# Patient Record
Sex: FEMALE | Race: WHITE | NOT HISPANIC OR LATINO | Employment: STUDENT | URBAN - METROPOLITAN AREA
[De-identification: names, ages, dates, MRNs, and addresses within clinical notes are randomized per-mention and may not be internally consistent; named-entity substitution may affect disease eponyms.]

---

## 2017-04-10 ENCOUNTER — TELEPHONE (OUTPATIENT)
Dept: OBSTETRICS AND GYNECOLOGY | Facility: CLINIC | Age: 22
End: 2017-04-10

## 2017-04-10 DIAGNOSIS — N92.1 BREAKTHROUGH BLEEDING WITH IUD: Primary | ICD-10-CM

## 2017-04-10 DIAGNOSIS — Z97.5 BREAKTHROUGH BLEEDING WITH IUD: Primary | ICD-10-CM

## 2017-04-10 RX ORDER — ESTRADIOL 1 MG/1
1 TABLET ORAL DAILY
Qty: 7 TABLET | Refills: 0 | Status: SHIPPED | OUTPATIENT
Start: 2017-04-10 | End: 2017-04-17

## 2017-04-10 NOTE — TELEPHONE ENCOUNTER
I spoke with the patient.  She reports that she previously had been having regular periods that were very light, following the Leesa insertion.  Her current bleeding episode began about 1 month ago.  It started as a regular period but was heavy.  The bleeding never stopped.  He has been ongoing for about 1 month.  She does report that now her bleeding is very scant and appears to be stopping.  I explained to the patient the likely mechanisms of breakthrough bleeding associated with progesterone IUDs.  This may be due to prolonged progestin effect.  I advised the patient to monitor her bleeding for another 2-3 days.  If it stops, I would just recommend routine follow-up.  If the bleeding intensifies again, we may try a short course of oral estradiol once daily for 7 days.  If her bleeding is persistent despite the oral estradiol, she will need to come to the office to see me for evaluation.  She verbalized understanding.    ----- Message from Cody Jarrell sent at 4/7/2017 12:46 PM EDT -----  Patient has been on leesa for 2 years and for the last month she has had constant bleeding. She says the bleeding is not heavy. She doesn't have health insurance at the moment so she doesn't want to come in for a visit. She wants to know what her options are.

## 2017-06-15 ENCOUNTER — TELEPHONE (OUTPATIENT)
Dept: OBSTETRICS AND GYNECOLOGY | Facility: CLINIC | Age: 22
End: 2017-06-15

## 2017-06-15 NOTE — TELEPHONE ENCOUNTER
Chanell, please let her know Dr. Levy is out of the office until next week and I reviewed her chart and he recommended that if her bleeding continued to be an issue, she should come see him.  If her bleeding is not heavy, then it does not need to be ASAP.  She can see him next available appointment.    ----- Message from Chanell Reyes sent at 6/15/2017  3:37 PM EDT -----  PT called to report 3 week period after not having period for a month and then being prescribed estrogen pills. No heavy bleeding; however, heavy cramping and nausea. Would like to know if she needs to be seen ASAP.

## 2017-07-13 ENCOUNTER — TELEPHONE (OUTPATIENT)
Dept: OBSTETRICS AND GYNECOLOGY | Facility: CLINIC | Age: 22
End: 2017-07-13

## 2017-08-21 ENCOUNTER — OFFICE VISIT (OUTPATIENT)
Dept: OBSTETRICS AND GYNECOLOGY | Facility: CLINIC | Age: 22
End: 2017-08-21

## 2017-08-21 ENCOUNTER — TELEPHONE (OUTPATIENT)
Dept: OBSTETRICS AND GYNECOLOGY | Facility: CLINIC | Age: 22
End: 2017-08-21

## 2017-08-21 VITALS
BODY MASS INDEX: 24.84 KG/M2 | WEIGHT: 135 LBS | DIASTOLIC BLOOD PRESSURE: 81 MMHG | HEIGHT: 62 IN | SYSTOLIC BLOOD PRESSURE: 126 MMHG | HEART RATE: 99 BPM

## 2017-08-21 DIAGNOSIS — N89.8 VAGINAL DISCHARGE: Primary | ICD-10-CM

## 2017-08-21 DIAGNOSIS — Z11.4 SCREENING FOR HIV (HUMAN IMMUNODEFICIENCY VIRUS): ICD-10-CM

## 2017-08-21 DIAGNOSIS — Z20.2 EXPOSURE TO STD: ICD-10-CM

## 2017-08-21 DIAGNOSIS — Z11.3 SCREEN FOR STD (SEXUALLY TRANSMITTED DISEASE): ICD-10-CM

## 2017-08-21 PROBLEM — Z97.5 BREAKTHROUGH BLEEDING WITH IUD: Status: RESOLVED | Noted: 2017-04-10 | Resolved: 2017-08-21

## 2017-08-21 PROBLEM — N92.1 BREAKTHROUGH BLEEDING WITH IUD: Status: RESOLVED | Noted: 2017-04-10 | Resolved: 2017-08-21

## 2017-08-21 LAB
BILIRUB BLD-MCNC: NEGATIVE MG/DL
CLARITY, POC: CLEAR
COLOR UR: YELLOW
GLUCOSE UR STRIP-MCNC: NEGATIVE MG/DL
KETONES UR QL: NEGATIVE
LEUKOCYTE EST, POC: ABNORMAL
NITRITE UR-MCNC: NEGATIVE MG/ML
PH UR: 5.5 [PH] (ref 5–8)
PROT UR STRIP-MCNC: NEGATIVE MG/DL
RBC # UR STRIP: NEGATIVE /UL
SP GR UR: 1.02 (ref 1–1.03)
UROBILINOGEN UR QL: NORMAL

## 2017-08-21 PROCEDURE — 96372 THER/PROPH/DIAG INJ SC/IM: CPT | Performed by: OBSTETRICS & GYNECOLOGY

## 2017-08-21 PROCEDURE — 99213 OFFICE O/P EST LOW 20 MIN: CPT | Performed by: OBSTETRICS & GYNECOLOGY

## 2017-08-21 RX ORDER — BUSPIRONE HYDROCHLORIDE 7.5 MG/1
TABLET ORAL
Refills: 3 | COMMUNITY
Start: 2017-08-08 | End: 2017-12-05

## 2017-08-21 RX ORDER — CEFTRIAXONE SODIUM 250 MG/1
250 INJECTION, POWDER, FOR SOLUTION INTRAMUSCULAR; INTRAVENOUS EVERY 24 HOURS
Status: DISCONTINUED | OUTPATIENT
Start: 2017-08-21 | End: 2017-12-05

## 2017-08-21 RX ORDER — AZITHROMYCIN 500 MG/1
TABLET, FILM COATED ORAL
Qty: 2 TABLET | Refills: 0 | Status: SHIPPED | OUTPATIENT
Start: 2017-08-21 | End: 2017-12-05

## 2017-08-21 RX ORDER — PAROXETINE HYDROCHLORIDE 20 MG/1
TABLET, FILM COATED ORAL
Refills: 3 | COMMUNITY
Start: 2017-08-08 | End: 2018-10-19 | Stop reason: ALTCHOICE

## 2017-08-21 RX ORDER — CEFTRIAXONE SODIUM 250 MG/1
250 INJECTION, POWDER, FOR SOLUTION INTRAMUSCULAR; INTRAVENOUS EVERY 24 HOURS
Status: DISCONTINUED | OUTPATIENT
Start: 2017-08-21 | End: 2017-08-21

## 2017-08-21 RX ADMIN — CEFTRIAXONE SODIUM 250 MG: 250 INJECTION, POWDER, FOR SOLUTION INTRAMUSCULAR; INTRAVENOUS at 16:17

## 2017-08-21 NOTE — PROGRESS NOTES
"Subjective   Latosha Cutler is a 21 y.o. female   CC: Pt here for STD check.  History of Present Illness  Pt here for STD check.  Patient reports that she was diagnosed with chlamydia infection about 2 months ago.  She reports that time she was having vaginal discharge, malodorous discharge, and irregular bleeding.  She reports that those symptoms have returned, and she is concerned that she may have been exposed to chlamydia again.  She has been sexually active with the same partner through which she contracted chlamydia last time.  She denies pelvic pain.  She denies abdominal pain.  She denies fevers, chills, nausea, vomiting.   She does report a history of frequent urinary tract infections.  She denies any dysuria today.  She does report some dark urine recently.  The patient requests to be treated empirically for gonorrhea and chlamydia infections.  She also requests STD testing today.    The following portions of the patient's history were reviewed and updated as appropriate: allergies, current medications, past family history, past medical history, past social history, past surgical history and problem list.    Review of Systems  General: No fever or chills  Abdomen: No nausea, vomiting, constipation or diarrhea  : No dysuria, no hematuria  Skin: No rashes  Psych: Normal though content, no hallucinations, no SI/HI    Objective   Physical Exam  Vitals:    08/21/17 1524   BP: 126/81   Pulse: 99   Weight: 135 lb (61.2 kg)   Height: 62\" (157.5 cm)   Gen: No acute distress, awake and oriented times three  Abdomen: soft, nontender, non distended, normoactive bowel sounds  Pelvic:   Normal external female genitalia, no lesions  Vagina: Mild bloody malodorous DC noted  Cervix: No cervical motion tenderness, no lesions, nonfriable, IUD strings seen  Uterus: Anteverted, normal size and shape, nontender  Adnexa: No masses or tenderness  Rectal: Deferred  Psych: Good judgement and insight, normal affect and " mood      Assessment/Plan   Diagnoses and all orders for this visit:    Vaginal discharge  -     NuSwab VG+    Exposure to STD  -     cefTRIAXone (ROCEPHIN) injection 250 mg; Inject 250 mg into the shoulder, thigh, or buttocks Daily.  -     azithromycin (ZITHROMAX) 500 MG tablet; Take 2 pills once  -     NuSwab VG+  -     HIV-1 / O / 2 Ag / Antibody 4th Generation  -     Hepatitis C Antibody  -     Hepatitis B Surface Antigen  -     RPR    Screen for STD (sexually transmitted disease)  -     NuSwab VG+  -     HIV-1 / O / 2 Ag / Antibody 4th Generation  -     Hepatitis C Antibody  -     Hepatitis B Surface Antigen  -     RPR    Screening for HIV (human immunodeficiency virus)  -     HIV-1 / O / 2 Ag / Antibody 4th Generation      As per the patient's request, we will treat her empirically for possible gonorrhea and chlamydia infection.  We will treat her with Rocephin and azithromycin.  Safe sex practices were discussed.  Cultures were obtained.  She also requests STD blood work.  We will notify the patient of the results.  She is instructed to call our office for the results if she has not heard the results in 1 week.  Office urinalysis today not really consistent with a urinary tract infection.  Patient advised to discuss this with her primary care physician, and possibly consider referral to urology for evaluation if she does continue to have repetitive urinary tract infections.  The patient return to the office as needed.    I spent 10 out of 15 minutes with the patient in face to face counseling of the above issues.

## 2017-08-21 NOTE — TELEPHONE ENCOUNTER
The patient may see me today at my 3:30 appointment.  The patient originally scheduled for that time has canceled.  I will not be able to see the patient if she arrives later than 3:30.  She may also be scheduled for next week on Monday 8/28/17 if she prefers that; however, she should be advised that I will be on call and could always get called out of the office to attend the hospital.    ----- Message from Abi Davis sent at 8/21/2017 11:59 AM EDT -----  Pt wanting to know if she come in sooner then Sx appt date. Pt states she is 99.9% that she has an STD and due to symptoms she has before in the pass, please advsive.

## 2017-08-22 ENCOUNTER — TELEPHONE (OUTPATIENT)
Dept: OBSTETRICS AND GYNECOLOGY | Facility: CLINIC | Age: 22
End: 2017-08-22

## 2017-08-22 LAB
HBV SURFACE AG SERPL QL IA: NEGATIVE
HCV AB S/CO SERPL IA: <0.1 S/CO RATIO (ref 0–0.9)
HIV 1+2 AB+HIV1 P24 AG SERPL QL IA: NON REACTIVE
RPR SER QL: NORMAL

## 2017-08-22 NOTE — TELEPHONE ENCOUNTER
----- Message from Olu Levy MD sent at 8/22/2017  9:52 AM EDT -----  Let the patient know that her STD blood work was negative.  I'm still waiting for the culture results.

## 2017-08-25 ENCOUNTER — TELEPHONE (OUTPATIENT)
Dept: OBSTETRICS AND GYNECOLOGY | Facility: CLINIC | Age: 22
End: 2017-08-25

## 2017-08-25 LAB
A VAGINAE DNA VAG QL NAA+PROBE: ABNORMAL SCORE
BVAB2 DNA VAG QL NAA+PROBE: ABNORMAL SCORE
C ALBICANS DNA VAG QL NAA+PROBE: NEGATIVE
C GLABRATA DNA VAG QL NAA+PROBE: NEGATIVE
C TRACH RRNA SPEC QL NAA+PROBE: POSITIVE
MEGA1 DNA VAG QL NAA+PROBE: ABNORMAL SCORE
N GONORRHOEA RRNA SPEC QL NAA+PROBE: NEGATIVE
T VAGINALIS RRNA SPEC QL NAA+PROBE: NEGATIVE

## 2017-08-25 NOTE — TELEPHONE ENCOUNTER
----- Message from Olu Levy MD sent at 8/25/2017 11:14 AM EDT -----  Let the patient know that her chlamydia test was positive.  The azithromycin that I prescribed her should be adequately treating this.  I would recommend that she come back in about 3 months to be sure that she has not gotten reinfected.  Encouraged the patient to use condoms for prevention of sexually transmitted diseases.

## 2017-09-06 ENCOUNTER — TELEPHONE (OUTPATIENT)
Dept: OBSTETRICS AND GYNECOLOGY | Facility: CLINIC | Age: 22
End: 2017-09-06

## 2017-11-28 ENCOUNTER — TELEPHONE (OUTPATIENT)
Dept: OBSTETRICS AND GYNECOLOGY | Facility: CLINIC | Age: 22
End: 2017-11-28

## 2017-11-28 DIAGNOSIS — B96.89 BACTERIAL VAGINOSIS: Primary | ICD-10-CM

## 2017-11-28 DIAGNOSIS — N76.0 BACTERIAL VAGINOSIS: Primary | ICD-10-CM

## 2017-11-28 RX ORDER — METRONIDAZOLE 500 MG/1
500 TABLET ORAL 2 TIMES DAILY
Qty: 14 TABLET | Refills: 0 | Status: SHIPPED | OUTPATIENT
Start: 2017-11-28 | End: 2017-12-05

## 2017-11-28 NOTE — TELEPHONE ENCOUNTER
----- Message from Johanny Ahuja sent at 11/27/2017  4:40 PM EST -----  Contact: Patient  Patient complaining of possible BV. She feels that it also might be chlamydia. She requested to have both flagyl and zithromax sent into her pharmacy. I told her I was unsure if this was possible without appropriate testing. She has appointment on 12/5, would you like her to be worked in sooner?

## 2017-11-28 NOTE — TELEPHONE ENCOUNTER
I spoke with the patient.  She reports that she is having vaginal odor and discharge, as well as irritation and irregular bleeding.  She had positive chlamydia and bacterial vaginosis test in August 2017.  She reports that she has been with the same partner, and she is uncertain if she has been reexposed to chlamydia, or if she has had a treatment failure.  I will send in empiric treatment for bacterial vaginosis with metronidazole.  I would recommend the patient come into the office next week for definitive testing for chlamydia.  I would prefer not to treat the patient empirically see that we may no chlamydia result.  She verbalizes understanding.

## 2017-12-05 ENCOUNTER — OFFICE VISIT (OUTPATIENT)
Dept: OBSTETRICS AND GYNECOLOGY | Facility: CLINIC | Age: 22
End: 2017-12-05

## 2017-12-05 VITALS
HEIGHT: 62 IN | SYSTOLIC BLOOD PRESSURE: 120 MMHG | DIASTOLIC BLOOD PRESSURE: 73 MMHG | BODY MASS INDEX: 25.95 KG/M2 | WEIGHT: 141 LBS | HEART RATE: 98 BPM

## 2017-12-05 DIAGNOSIS — Z11.3 SCREEN FOR STD (SEXUALLY TRANSMITTED DISEASE): ICD-10-CM

## 2017-12-05 DIAGNOSIS — N89.8 VAGINAL DISCHARGE: Primary | ICD-10-CM

## 2017-12-05 DIAGNOSIS — Z20.2 EXPOSURE TO STD: ICD-10-CM

## 2017-12-05 DIAGNOSIS — Z30.432 ENCOUNTER FOR IUD REMOVAL: ICD-10-CM

## 2017-12-05 DIAGNOSIS — Z30.011 ENCOUNTER FOR INITIAL PRESCRIPTION OF CONTRACEPTIVE PILLS: ICD-10-CM

## 2017-12-05 PROCEDURE — 99213 OFFICE O/P EST LOW 20 MIN: CPT | Performed by: OBSTETRICS & GYNECOLOGY

## 2017-12-05 PROCEDURE — 58301 REMOVE INTRAUTERINE DEVICE: CPT | Performed by: OBSTETRICS & GYNECOLOGY

## 2017-12-05 RX ORDER — NORGESTIMATE AND ETHINYL ESTRADIOL 0.25-0.035
1 KIT ORAL DAILY
Qty: 28 TABLET | Refills: 12 | Status: SHIPPED | OUTPATIENT
Start: 2017-12-05 | End: 2018-05-07

## 2017-12-05 NOTE — PROGRESS NOTES
Subjective   Latosha Cutler is a 22 y.o. female   CC: Pt here for possible infection. Also wants to discuss birth control.  History of Present Illness  Pt here for possible infection. Also wants to discuss birth control.  Patient has a history of a chlamydia infection in August of this year.  She was adequately treated for this.  Patient also reports frequent bacterial vaginosis type symptoms.  She called our office last week for similar concerns and wanted to be tested again for another chlamydia infection.  I recommended treating empirically for bacterial vaginosis with metronidazole for a week and then following up with me in the office this week.  The patient reports that since taken the metronidazole her symptoms have resolved.  She is no longer having vaginal discharge and odor.  She would still like to be checked for Chlamydia infection.  The patient is also concerned about her intrauterine device.  She reports frequent bacterial vaginal infections, and she thinks this may be related to her intrauterine device.  She is also unhappy with the irregular bleeding profile.  Patient would like to have the device removed today and would like to start back on birth control pills.    The following portions of the patient's history were reviewed and updated as appropriate: allergies, current medications, past family history, past medical history, past social history, past surgical history and problem list.    Review of Systems  General: No fever or chills  Constitutional: No weight loss or gain, no hair loss  HENT: No headache, no hearing loss, no tinnitus  Eyes: normal vision, no eye pain  Lungs: No cough, no shortness of breath  Heart: No chest pain, no palpitations  Abdomen: No nausea, vomiting, constipation or diarrhea  : No dysuria, no hematuria  Skin: No rashes  Lymph: No swelling  Neuro: No parathesia, no weakness  Psych: Normal though content, no hallucinations, no SI/HI    Objective   Physical Exam  Vitals:  "   12/05/17 1439   BP: 120/73   Pulse: 98   Weight: 64 kg (141 lb)   Height: 157.5 cm (62\")   Gen: No acute distress, awake and oriented times three  Abdomen: soft, nontender, non distended, normoactive bowel sounds  Pelvic:   Normal external female genitalia, no lesions  Vagina: No blood or discharge  Cervix: No cervical motion tenderness, no lesions, no active bleeding, nonfriable, IUD strings seen at os  Bimanual: Deferred  Psych: Good judgement and insight, normal affect and mood      Assessment/Plan   Diagnoses and all orders for this visit:    Vaginal discharge  -     NuSwab VG+ - Swab, Vagina    Exposure to STD  -     NuSwab VG+ - Swab, Vagina    Screen for STD (sexually transmitted disease)  -     NuSwab VG+ - Swab, Vagina    Encounter for IUD removal    Encounter for initial prescription of contraceptive pills  -     norgestimate-ethinyl estradiol (ORTHO-CYCLEN) 0.25-35 MG-MCG per tablet; Take 1 tablet by mouth Daily.        Patient's most recent symptoms are likely secondary to recurrent bacterial vaginosis infection.  We will send off cultures today and treat as indicated.  We discussed issues with recurrent bacterial infections in various situations.  It is possible that this may be related to her intrauterine device.  The patient would like to have the intrauterine device removed today (see procedure note).  She requests birth control pills.  The risks, benefits, and alternatives to birth control pills were discussed with the patient.  Side effects were discussed.  Instructions for use were given to the patient at length.  Condoms or recommended during the first month as well as for prevention of STDs.  All her questions are answered today.  I would otherwise see the patient back as needed.    I spent 10 out of 15 minutes with the patient in face to face counseling of the above issues.           "

## 2017-12-05 NOTE — PROGRESS NOTES
Procedure   Procedures      Procedure: Intrauterine device removal  Preoperative diagnosis: Encounter for removal intrauterine device  2.  Recurrent bacterial vaginosis infections  Postoperative diagnosis: Same  Indications: Patient requests removal of her intrauterine device because of concerns of a recurrent infections.  She wishes start birth control pills as an alternative method of contraception.  See office note from today for details.  Anesthesia: None  Pathology: None  Estimated blood loss: Less than 5 mL  Complications: None    Procedure in detail:  Patient placed in lithotomy position in stirrups. [place] speculum placed into vagina. Cervix was visualized. IUD strings were seen at the external os. Strings were grasped with a ring forceps and the IUD was easily removed with slow, steady, gentle tension. IUD was removed intact and discarded. No complications. Patient tolerated the procedure well. She was instructed to use condoms as a backup method for the first month if not attempting to conceive.

## 2017-12-08 LAB
A VAGINAE DNA VAG QL NAA+PROBE: ABNORMAL SCORE
BVAB2 DNA VAG QL NAA+PROBE: ABNORMAL SCORE
C ALBICANS DNA VAG QL NAA+PROBE: NEGATIVE
C GLABRATA DNA VAG QL NAA+PROBE: NEGATIVE
C TRACH RRNA SPEC QL NAA+PROBE: NEGATIVE
MEGA1 DNA VAG QL NAA+PROBE: ABNORMAL SCORE
N GONORRHOEA RRNA SPEC QL NAA+PROBE: POSITIVE
T VAGINALIS RRNA SPEC QL NAA+PROBE: NEGATIVE

## 2017-12-11 ENCOUNTER — TELEPHONE (OUTPATIENT)
Dept: OBSTETRICS AND GYNECOLOGY | Facility: CLINIC | Age: 22
End: 2017-12-11

## 2017-12-11 DIAGNOSIS — IMO0002: Primary | ICD-10-CM

## 2017-12-11 RX ORDER — AZITHROMYCIN 500 MG/1
TABLET, FILM COATED ORAL
Qty: 2 TABLET | Refills: 0 | Status: SHIPPED | OUTPATIENT
Start: 2017-12-11 | End: 2018-05-07

## 2017-12-11 NOTE — TELEPHONE ENCOUNTER
Notify the patient that her gonorrhea test was positive.  I sent in a prescription for azithromycin.  She will also need to come into the office for a Rocephin injection.  She will need to notify her partner and he will need to be tested and/or treated as well.  They must abstain from sexual intercourse until 7 days after both have completed treatment.

## 2017-12-11 NOTE — TELEPHONE ENCOUNTER
----- Message from Minoo Valle sent at 12/11/2017  8:30 AM EST -----  Patient called about her lab results from 12/05/2017. Please advise.      Call back #: [phone]

## 2017-12-13 ENCOUNTER — CLINICAL SUPPORT (OUTPATIENT)
Dept: OBSTETRICS AND GYNECOLOGY | Facility: CLINIC | Age: 22
End: 2017-12-13

## 2017-12-13 VITALS
HEART RATE: 101 BPM | SYSTOLIC BLOOD PRESSURE: 121 MMHG | DIASTOLIC BLOOD PRESSURE: 77 MMHG | HEIGHT: 62 IN | BODY MASS INDEX: 26.31 KG/M2 | WEIGHT: 143 LBS

## 2017-12-13 DIAGNOSIS — IMO0002: Primary | ICD-10-CM

## 2017-12-13 PROCEDURE — 96372 THER/PROPH/DIAG INJ SC/IM: CPT | Performed by: OBSTETRICS & GYNECOLOGY

## 2017-12-13 RX ORDER — CEFTRIAXONE SODIUM 250 MG/1
250 INJECTION, POWDER, FOR SOLUTION INTRAMUSCULAR; INTRAVENOUS EVERY 24 HOURS
Status: COMPLETED | OUTPATIENT
Start: 2017-12-13 | End: 2017-12-13

## 2017-12-13 RX ADMIN — CEFTRIAXONE SODIUM 250 MG: 250 INJECTION, POWDER, FOR SOLUTION INTRAMUSCULAR; INTRAVENOUS at 13:22

## 2018-01-19 ENCOUNTER — TELEPHONE (OUTPATIENT)
Dept: OBSTETRICS AND GYNECOLOGY | Facility: CLINIC | Age: 23
End: 2018-01-19

## 2018-01-19 NOTE — TELEPHONE ENCOUNTER
Pt called and stated that she is not doing well with taking the b/c pills consistently and would like to get the Mirena put back in. She states that she is willing to come in to sign the consent forms to get the PA started if you're willing to place another IUD

## 2018-01-26 ENCOUNTER — TELEPHONE (OUTPATIENT)
Dept: OBSTETRICS AND GYNECOLOGY | Facility: CLINIC | Age: 23
End: 2018-01-26

## 2018-01-26 DIAGNOSIS — Z32.02 NEGATIVE PREGNANCY TEST: Primary | ICD-10-CM

## 2018-01-26 LAB — HCG INTACT+B SERPL-ACNC: <0.5 MIU/ML

## 2018-01-26 NOTE — TELEPHONE ENCOUNTER
----- Message from Minoo Valle sent at 1/25/2018  3:58 PM EST -----  Pt called stating that she is in between Mirenas and is waiting for approval to have her new one placed in. She states she had a very light period on December 18th through Dec. 21. She then stated that she had unprotected sex on January 1st and hasn't had a period since. She took a pregnancy test on 01/24/2018 and it was negative. She is just wanting an ease of mind knowing that she isn't pregnant. What do you advise?     Call back #: [phone]

## 2018-01-26 NOTE — TELEPHONE ENCOUNTER
She may come in for a blood pregnancy test if she wishes.  We will get the results the following day.  She may also weight and take another urine pregnancy test next week at home.  I would not recommend any further episodes of unprotected intercourse until we are able to place her Mirena.

## 2018-01-29 NOTE — TELEPHONE ENCOUNTER
Left message to call office. Pt does not currently have coverage with [employer]. Could not get her IUD. JEF

## 2018-04-06 ENCOUNTER — TELEPHONE (OUTPATIENT)
Dept: OBSTETRICS AND GYNECOLOGY | Facility: CLINIC | Age: 23
End: 2018-04-06

## 2018-04-06 NOTE — TELEPHONE ENCOUNTER
Talked to pt.  Pt aware we need insurance information before we can start the benefit verification process for her Mirena. Pt will call back when she has the information. JEF

## 2018-04-06 NOTE — TELEPHONE ENCOUNTER
----- Message from Steffanie Goldberg sent at 4/6/2018 10:43 AM EDT -----  Contact: Patient   Patient states she did have [employer] but now has [employer] through her employer.  Patient states she still wants to get the Mirena and wants to know if you can start the process on checking her benefits?    [phone]

## 2018-04-27 ENCOUNTER — TELEPHONE (OUTPATIENT)
Dept: OBSTETRICS AND GYNECOLOGY | Facility: CLINIC | Age: 23
End: 2018-04-27

## 2018-04-27 NOTE — TELEPHONE ENCOUNTER
Pt called regarding her birth control, pt has [employer] insurance and will be losing her benefits in about 3 weeks. Pt wants to see if she can get her borth control changed before she loses her benefits. Pt wasn't sure if this was something that had to be approved through her insurance. Pt also stated that she filled out a form for permission to contact [employer], but pts Passport is inactive as she has [employer] now. Does pt need to fill out a new form? Please advise.      Pt call back: [phone]

## 2018-04-27 NOTE — TELEPHONE ENCOUNTER
Talked to pt. Sent off Mirena request with her [employer] insurance. Pt is aware that we may not have devise back by the time her insurance expires. JEF

## 2018-05-07 ENCOUNTER — PROCEDURE VISIT (OUTPATIENT)
Dept: OBSTETRICS AND GYNECOLOGY | Facility: CLINIC | Age: 23
End: 2018-05-07

## 2018-05-07 VITALS
BODY MASS INDEX: 26.13 KG/M2 | SYSTOLIC BLOOD PRESSURE: 136 MMHG | WEIGHT: 142 LBS | DIASTOLIC BLOOD PRESSURE: 85 MMHG | HEART RATE: 94 BPM | HEIGHT: 62 IN

## 2018-05-07 DIAGNOSIS — Z30.430 ENCOUNTER FOR IUD INSERTION: Primary | ICD-10-CM

## 2018-05-07 DIAGNOSIS — Z11.3 SCREEN FOR STD (SEXUALLY TRANSMITTED DISEASE): ICD-10-CM

## 2018-05-07 DIAGNOSIS — Z32.02 NEGATIVE PREGNANCY TEST: ICD-10-CM

## 2018-05-07 PROBLEM — Z30.432 ENCOUNTER FOR IUD REMOVAL: Status: RESOLVED | Noted: 2017-12-05 | Resolved: 2018-05-07

## 2018-05-07 PROBLEM — Z30.011 ENCOUNTER FOR INITIAL PRESCRIPTION OF CONTRACEPTIVE PILLS: Status: RESOLVED | Noted: 2017-12-05 | Resolved: 2018-05-07

## 2018-05-07 LAB
B-HCG UR QL: NEGATIVE
INTERNAL NEGATIVE CONTROL: NEGATIVE
INTERNAL POSITIVE CONTROL: POSITIVE
Lab: NORMAL

## 2018-05-07 PROCEDURE — 58300 INSERT INTRAUTERINE DEVICE: CPT | Performed by: OBSTETRICS & GYNECOLOGY

## 2018-05-07 PROCEDURE — 81025 URINE PREGNANCY TEST: CPT | Performed by: OBSTETRICS & GYNECOLOGY

## 2018-05-07 NOTE — PROGRESS NOTES
Procedure   Procedures   pt here for Mirena insertion.    Procedure: Mirena Intrauterine device insertion  Preoperative diagnosis: Encounter for IUD insertion  Postoperative diagnosis: Same  Indications: Patient is tried many forms of birth control.  She previous had an IUD in the past, but had it removed and she thought it may be causing infections.  She feels this was no longer an issue, and she requests to have another IUD placed at this time.  She is currently on her period.  Patient does have a history of gonorrhea in the past.  She was diagnosed with gonorrhea December 2017 and was treated.  She does request repeat screening today as well.  Findings: Uterus anteverted, 8 weeks size, normal location.  No significant cervical discharge.  Mild amount of typical menstrual blood noted.  Anesthesia: None  Pathology: None  Estimated blood loss: Less than 5 mL  Complications: None    The risks, benefits, and alternatives to Mirena were explained at length with the patient. All her questions were answered and consents were signed.  The patient was placed in a dorsal lithotomy position on the examining table in [facility]. A bimanual exam confirmed the uterus was normal in size, anteverted, and midplane. A warmed metal speculum was inserted into the vagina and the cervix was brought into view.  Cervical cultures were collected.  The cervix was prepped with Betadine. The anterior lip was grasped with a single-tooth tenaculum. The endometrial cavity was then sounded to 7.5 cm without use of a dilator. Gloves were then exchanged for sterile gloves. This sealed Mirena package was opened and the Mirena was removed in a sterile fashion.  The upper edge of the depth setting the flange was set at a uterine sound measured. The  was then carefully advanced to the cervical canal into the uterus to the level of the fundus. This was then backed off about 1.5-2 cm to allow sufficient space for the arms to open. The slider was  then retracted about 1 cm and deployed the device. The device was then gently advanced to the fundus. The Mirena was then released by pulling the slider down all the way. The  was removed carefully from the uterus. The threads were then cut leaving 2-3 cm visible outside of the cervix.  The single-tooth tenaculum was removed from the anterior lip. Silver nitrate was applied to the tenaculum sites. Good hemostasis was noted. All other instruments were removed from the vagina. There were no complications, and the patient tolerated the procedure well with a minimal amount of discomfort. The patient was counseled about the need to return in 4 weeks for string check. She was counseled about the need to use a backup method of contraception such as condoms until her post insertion exam was performed. The patient verbalized understanding that the Mirena will need to be removed/replaced after 5 years. The patient is counseled to contact us if she has any significant or increasing bleeding, pain, fever, chills, or other concerns. She is instructed to see a doctor right away if she believes that she may be pregnant at any time with the Mirena in place.

## 2018-05-10 ENCOUNTER — TELEPHONE (OUTPATIENT)
Dept: OBSTETRICS AND GYNECOLOGY | Facility: CLINIC | Age: 23
End: 2018-05-10

## 2018-05-10 LAB
C TRACH RRNA SPEC QL NAA+PROBE: NEGATIVE
N GONORRHOEA RRNA SPEC QL NAA+PROBE: NEGATIVE
T VAGINALIS RRNA SPEC QL NAA+PROBE: NEGATIVE

## 2018-05-10 NOTE — TELEPHONE ENCOUNTER
----- Message from James Levy MD sent at 5/10/2018 12:27 PM EDT -----  Notify the patient that the gonorrhea and chlamydia tests were negative

## 2018-05-16 ENCOUNTER — TELEPHONE (OUTPATIENT)
Dept: OBSTETRICS AND GYNECOLOGY | Facility: CLINIC | Age: 23
End: 2018-05-16

## 2018-05-16 DIAGNOSIS — B37.31 CANDIDA VAGINITIS: Primary | ICD-10-CM

## 2018-05-16 NOTE — TELEPHONE ENCOUNTER
Pt called stating she believes she has a yeast infection and that she has been having dark, bloody discharge since she got her Mirena inserted. She was wondering if this is normal. Please advise.    Call back # [phone]

## 2018-05-17 PROBLEM — B37.31 CANDIDA VAGINITIS: Status: ACTIVE | Noted: 2018-05-17

## 2018-05-17 RX ORDER — FLUCONAZOLE 150 MG/1
150 TABLET ORAL
Qty: 2 TABLET | Refills: 0 | Status: SHIPPED | OUTPATIENT
Start: 2018-05-17 | End: 2018-05-21

## 2018-05-17 NOTE — TELEPHONE ENCOUNTER
She just had her IUD inserted 10 days ago, so this discharge is completely normal. She can expect to have irregular bleeding or discharge for around 3 months after IUD placement, but it generally gets less and less often as the weeks go by.  I sent in RX for fluconazole for yeast

## 2018-06-29 ENCOUNTER — TELEPHONE (OUTPATIENT)
Dept: OBSTETRICS AND GYNECOLOGY | Facility: CLINIC | Age: 23
End: 2018-06-29

## 2018-06-29 RX ORDER — METRONIDAZOLE 500 MG/1
TABLET ORAL
Qty: 4 TABLET | Refills: 0 | Status: SHIPPED | OUTPATIENT
Start: 2018-06-29 | End: 2018-10-19

## 2018-06-29 NOTE — TELEPHONE ENCOUNTER
Steffanie GOMEZ have reviewed her tests for BV over the past two years and the ones that Dr Levy have done have been negative.    Therefore, I called in just one time oral Flagyl 4 tablets to be taken once.  If this does not cure her symptoms, she will need to be seen.    Bernarda        ----- Message from Steffanie Goldberg sent at 6/29/2018 11:06 AM EDT -----  Contact: Patient   Cam patient.   She is complaining of discharge and an odor.  Believes she has a bacterial infection and is requesting an RX.  States usually takes an oral medication.

## 2018-10-19 ENCOUNTER — OFFICE VISIT (OUTPATIENT)
Dept: OBSTETRICS AND GYNECOLOGY | Facility: CLINIC | Age: 23
End: 2018-10-19

## 2018-10-19 VITALS
BODY MASS INDEX: 26.68 KG/M2 | SYSTOLIC BLOOD PRESSURE: 125 MMHG | HEART RATE: 95 BPM | WEIGHT: 145 LBS | DIASTOLIC BLOOD PRESSURE: 78 MMHG | HEIGHT: 62 IN

## 2018-10-19 DIAGNOSIS — Z71.6 ENCOUNTER FOR TOBACCO USE CESSATION COUNSELING: ICD-10-CM

## 2018-10-19 DIAGNOSIS — R10.9 ABDOMINAL PAIN, UNSPECIFIED ABDOMINAL LOCATION: ICD-10-CM

## 2018-10-19 DIAGNOSIS — Z30.09 FAMILY PLANNING COUNSELING: ICD-10-CM

## 2018-10-19 DIAGNOSIS — Z01.419 ENCOUNTER FOR GYNECOLOGICAL EXAMINATION: Primary | ICD-10-CM

## 2018-10-19 DIAGNOSIS — Z11.3 SCREEN FOR STD (SEXUALLY TRANSMITTED DISEASE): ICD-10-CM

## 2018-10-19 DIAGNOSIS — Z12.4 SCREENING FOR CERVICAL CANCER: ICD-10-CM

## 2018-10-19 DIAGNOSIS — Z30.432 ENCOUNTER FOR IUD REMOVAL: ICD-10-CM

## 2018-10-19 PROBLEM — Z20.2 EXPOSURE TO STD: Status: RESOLVED | Noted: 2017-08-21 | Resolved: 2018-10-19

## 2018-10-19 PROBLEM — Z30.430 ENCOUNTER FOR IUD INSERTION: Status: RESOLVED | Noted: 2018-05-07 | Resolved: 2018-10-19

## 2018-10-19 PROBLEM — IMO0002 ACUTE GONORRHEA OF GENITOURINARY TRACT: Status: RESOLVED | Noted: 2017-12-11 | Resolved: 2018-10-19

## 2018-10-19 LAB
BILIRUB BLD-MCNC: NEGATIVE MG/DL
CLARITY, POC: CLEAR
COLOR UR: YELLOW
GLUCOSE UR STRIP-MCNC: NEGATIVE MG/DL
KETONES UR QL: NEGATIVE
LEUKOCYTE EST, POC: NEGATIVE
NITRITE UR-MCNC: NEGATIVE MG/ML
PH UR: 7 [PH] (ref 5–8)
PROT UR STRIP-MCNC: NEGATIVE MG/DL
RBC # UR STRIP: NEGATIVE /UL
SP GR UR: 1.02 (ref 1–1.03)
UROBILINOGEN UR QL: NORMAL

## 2018-10-19 PROCEDURE — 99395 PREV VISIT EST AGE 18-39: CPT | Performed by: OBSTETRICS & GYNECOLOGY

## 2018-10-19 PROCEDURE — 58301 REMOVE INTRAUTERINE DEVICE: CPT | Performed by: OBSTETRICS & GYNECOLOGY

## 2018-10-19 PROCEDURE — 99406 BEHAV CHNG SMOKING 3-10 MIN: CPT | Performed by: OBSTETRICS & GYNECOLOGY

## 2018-10-19 RX ORDER — PNV NO.95/FERROUS FUM/FOLIC AC 28MG-0.8MG
1 TABLET ORAL DAILY
Qty: 30 TABLET | Refills: 12 | Status: SHIPPED | OUTPATIENT
Start: 2018-10-19 | End: 2019-02-12

## 2018-10-19 NOTE — PROGRESS NOTES
Procedure: Mirena Intrauterine device removal  Preoperative diagnosis: Encounter for Mirena removal  2.  Desires to conceive  Postoperative diagnosis: Same  3.  IUD strings lost  Indications: Patient is here for Mirena IUD removed because she would like to conceive.  We cannot see the strings at the cervix today.  We will attempt to remove the IUD.  Anesthesia: None  Pathology: None  Estimated blood loss: Less than 5 mL  Complications: None    Procedure in detail:  Patient placed in lithotomy position in stirrups. [place] speculum placed into vagina. Cervix was visualized. IUD strings were not seen at the external os.  Several blind passes from within the cervix using a uterine dressing forceps were attempted to grasp the strings of the IUD and remove it.  These proved unsuccessful.  We will have the patient return in the near future for ultrasound-guided IUD removal.  I have it recommended that she take ibuprofen just prior to her next visit.  I have also given her prescription for single pillow of Percocet 5 mg to take this prior to her next visit to help with the procedure.

## 2018-10-19 NOTE — PROGRESS NOTES
Subjective   Latosha Cutler is a 23 y.o. female.   CC: Pt here for annual.  She would also like to have her Mirena removed.  History of Present Illness   Pt here for annual.  She would also like to have her Mirena removed.  The patient reports that she would like to try to conceive.  The Mirena was just inserted and they have 2018.  She does not have periods with the Mirena.  Recently, she has had some concern with lower abdominal pain.  This started about 4 days ago and lasted about 1-2 days.  She did have intercourse the day after the pain had stopped and also had some pain with intercourse.  She states that she is back to normal again today.  She describes the pain as a sharp shooting pain in her bilateral lower abdomen.  She denies vaginal discharge.  Last Pap smear was 3 years ago.  She declines STD blood work today.  She is a smoker.  She smokes about half pack per day.  The patient is recently been prescribed Paxil, but she states that she does not take it frequently.     Social History   Substance Use Topics   • Smoking status: Current Every Day Smoker     Packs/day: 1.00     Types: Cigarettes   • Smokeless tobacco: Never Used   • Alcohol use No     Allergies   Allergen Reactions   • Shellfish-Derived Products        Current Outpatient Prescriptions:   •  levonorgestrel (MIRENA) 20 MCG/24HR IUD, 1 each by Intrauterine route 1 (One) Time., Disp: , Rfl:   •  Prenatal Vit-Fe Fumarate-FA (PRENATAL VITAMINS) 28-0.8 MG tablet, Take 1 tablet by mouth Daily., Disp: 30 tablet, Rfl: 12     The following portions of the patient's history were reviewed and updated as appropriate: allergies, current medications, past family history, past medical history, past social history, past surgical history and problem list.    Review of Systems  General: No fever or chills  Constitutional: No weight loss or gain, no hair loss  HENT: No headache, no hearing loss, no tinnitus  Eyes: normal vision, no eye pain  Lungs: No cough, no  "shortness of breath  Heart: No chest pain, no palpitations  Abdomen: No nausea, vomiting, constipation or diarrhea  : No dysuria, no hematuria  Skin: No rashes  Lymph: No swelling  Neuro: No parathesia, no weakness  Psych: Normal though content, no hallucinations, no SI/HI    Objective   Physical Exam  Vitals:    10/19/18 0923   BP: 125/78   Pulse: 95   Weight: 65.8 kg (145 lb)   Height: 157.5 cm (62\")     Exam performed in the presence of a female chaperone  Patient has provided verbal consent to proceed with exam.    Gen: No acute distress, awake and oriented times three  HENT: Normocephalic, atraumatic, Moist mucous membranes  Eyes: PERRLA, EOMI  Neck: Supple, normal range of motion, no thyromegaly  Lungs: Normal work of breathing, lungs clear bilaterally, no crackles/wheezes  Heart: Regular rate and rhythm, no murmurs  Abdomen: soft, nontender, non distended, normoactive bowel sounds  Breast: Symmetrical. No skin changes or nipple retractions. No lumps or masses bilaterally. No tenderness bilaterally.  Pelvic:   Normal external female genitalia, no lesions  Vagina: No blood or discharge  Cervix: No cervical motion tenderness, no lesions, no active bleeding, nonfriable, IUD strins not seen  Uterus: Anteverted, normal size and shape, nontender  Adnexa: No masses or tenderness  Rectal: Deferred  Skin: Warm and dry, no rashes  Psych: Good judgement and insight, normal affect and mood  Neuro: CN 2-12 intact, no gross deficits    Office urinalysis: Negative    Assessment/Plan   Diagnoses and all orders for this visit:    Encounter for gynecological examination  Pap smear and cervical cultures performed today.  She declines STD blood work.  Patient is counseled about performing routine self breast exams.  She is instructed to call our office for the results if she has not heard them after 1 week.  Patient recently had some lower abdominal pain, but her exam is unremarkable today.  I suspect she may have had an " ovarian cyst.  Reassurance is offered.  Screening for cervical cancer  -     IGP,CtNgTv,rfx Apt HPV All    Screen for STD (sexually transmitted disease)  -     IGP,CtNgTv,rfx Apt HPV All    Family planning counseling  -     Prenatal Vit-Fe Fumarate-FA (PRENATAL VITAMINS) 28-0.8 MG tablet; Take 1 tablet by mouth Daily.  I advised the patient to start taking a prenatal vitamin.  Patient is also smoker.  We discussed the health benefits in general tobacco cessation.  I've also explained it is important for her to stop smoking prior to conception order to decrease chances of pregnancy complications.  Patient has Paxil listed on her medication list.  She states that she does not take this regularly.  I explained that Has really taking this medication is not usually beneficial.  Additionally, Paxil is category D medication, and if she requires medication for depression and anxiety, there may be better options available.  The patient reports that she prefers to not take it and she will discontinue use of Paxil at this time.  Encounter for IUD removal  The patient would like to have her IUD removed.  Unfortunately, we could not see the strings today.  I attempted IUD removal in the office.  Please see procedure note for details.  Ultimately, we were unable to remove the IUD.  She should return for ultrasound-guided IUD removal.  Abdominal pain, unspecified abdominal location  -     POC Urinalysis Dipstick    Encounter for tobacco use cessation counseling  .I advised Summer of the risks of continuing to use tobacco, and I provided her with tobacco cessation educational materials in the After Visit Summary.     During this visit, I spent greater than 3 minutes counseling the patient regarding tobacco cessation.

## 2018-10-26 ENCOUNTER — TELEPHONE (OUTPATIENT)
Dept: OBSTETRICS AND GYNECOLOGY | Facility: CLINIC | Age: 23
End: 2018-10-26

## 2018-10-26 LAB
C TRACH RRNA CVX QL NAA+PROBE: NEGATIVE
CONV .: ABNORMAL
CYTOLOGIST CVX/VAG CYTO: ABNORMAL
CYTOLOGY CVX/VAG DOC THIN PREP: ABNORMAL
DX ICD CODE: ABNORMAL
DX ICD CODE: ABNORMAL
HIV 1 & 2 AB SER-IMP: ABNORMAL
HPV I/H RISK 4 DNA CVX QL PROBE+SIG AMP: NEGATIVE
N GONORRHOEA RRNA CVX QL NAA+PROBE: NEGATIVE
OTHER STN SPEC: ABNORMAL
PATH REPORT.FINAL DX SPEC: ABNORMAL
PATHOLOGIST CVX/VAG CYTO: ABNORMAL
RECOM F/U CVX/VAG CYTO: ABNORMAL
STAT OF ADQ CVX/VAG CYTO-IMP: ABNORMAL
T VAGINALIS RRNA SPEC QL NAA+PROBE: NEGATIVE

## 2018-10-26 NOTE — TELEPHONE ENCOUNTER
----- Message from James Levy MD sent at 10/26/2018  9:52 AM EDT -----  Marla, notify the patient that her gonorrhea and chlamydia tests were negative.  Her Pap smear was slightly abnormal, showing LGSIL; however, her high risk HPV test was negative.  An abnormal Pap smear in the setting of a negative HPV test is unlikely to represent any sort of serious precancerous changes to the cervix.  In this scenario, the recommendation is to repeat the Pap test in 1 year.

## 2018-10-31 ENCOUNTER — PROCEDURE VISIT (OUTPATIENT)
Dept: OBSTETRICS AND GYNECOLOGY | Facility: CLINIC | Age: 23
End: 2018-10-31

## 2018-10-31 VITALS
SYSTOLIC BLOOD PRESSURE: 132 MMHG | BODY MASS INDEX: 26.31 KG/M2 | WEIGHT: 143 LBS | HEART RATE: 105 BPM | HEIGHT: 62 IN | DIASTOLIC BLOOD PRESSURE: 94 MMHG

## 2018-10-31 DIAGNOSIS — T83.32XD INTRAUTERINE CONTRACEPTIVE DEVICE THREADS LOST, SUBSEQUENT ENCOUNTER: ICD-10-CM

## 2018-10-31 DIAGNOSIS — Z30.432 ENCOUNTER FOR IUD REMOVAL: Primary | ICD-10-CM

## 2018-10-31 PROBLEM — N89.8 VAGINAL DISCHARGE: Status: RESOLVED | Noted: 2017-08-21 | Resolved: 2018-10-31

## 2018-10-31 PROBLEM — Z12.4 SCREENING FOR CERVICAL CANCER: Status: RESOLVED | Noted: 2018-10-19 | Resolved: 2018-10-31

## 2018-10-31 PROBLEM — Z30.09 FAMILY PLANNING COUNSELING: Status: RESOLVED | Noted: 2018-10-19 | Resolved: 2018-10-31

## 2018-10-31 PROBLEM — T83.32XA IUD THREADS LOST: Status: ACTIVE | Noted: 2018-10-31

## 2018-10-31 PROBLEM — Z11.3 SCREEN FOR STD (SEXUALLY TRANSMITTED DISEASE): Status: RESOLVED | Noted: 2018-10-19 | Resolved: 2018-10-31

## 2018-10-31 PROBLEM — Z11.4 SCREENING FOR HIV (HUMAN IMMUNODEFICIENCY VIRUS): Status: RESOLVED | Noted: 2017-08-21 | Resolved: 2018-10-31

## 2018-10-31 PROBLEM — B37.31 CANDIDA VAGINITIS: Status: RESOLVED | Noted: 2018-05-17 | Resolved: 2018-10-31

## 2018-10-31 PROCEDURE — 58301 REMOVE INTRAUTERINE DEVICE: CPT | Performed by: OBSTETRICS & GYNECOLOGY

## 2018-10-31 PROCEDURE — 76856 US EXAM PELVIC COMPLETE: CPT | Performed by: OBSTETRICS & GYNECOLOGY

## 2018-10-31 NOTE — PROGRESS NOTES
"Procedure   Procedures   CC: pt here for US guided IUD removal.     Procedure: Ultrasound-guided Mirena Intrauterine device removal  Preoperative diagnosis:   1.  23-year-old  3 para 2 who desires Mirena removal  2.  IUD threads lost  Postoperative diagnosis: Same  Indications: Patient had desired IUD removal in order to try to conceive.  The IUD strings could not be seen during previous exams.  We attempted IUD removal in the office last week, but were unsuccessful.  The patient has returned today for ultrasound-guided IUD removal secondary to nonvisualization of the IUD strings.  On ultrasound today, the IUD is noted to be in a normal location at the level of the fundus.  Uterus is anteverted and normal size.  Anesthesia: None  Pathology: None  Estimated blood loss: Less than 5 mL  Complications: None    Procedure in detail:  Patient placed in lithotomy position in stirrups.  Transvaginal ultrasound was performed confirming IUD location at the level of the fundus.  Uterus is anteverted.  The transvaginal probe was removed and an abdominal ultrasound is performed.  Hartsel speculum placed into vagina. Cervix was visualized. IUD strings were not seen at the external os.  While watching with real-time transabdominal ultrasound, the IUD \"was passed through the cervix to the level of the fundus.  The hook seem to grasp the top level of the IUD and gently moving downward; however, was unable initially to remove the hook from the internal cervical os.  Eventually, I was able to remove the hook but without the IUD.  The string still cannot be seen.  Next, I attempted to pass a uterine dressing forceps through the cervix to the lower aspect of the IUD to try to grasp it with ultrasound guidance.  Initial attempts proved unsuccessful.  I felt that perhaps hemostat will be more appropriate as it is slightly narrower than the uterine dressing forceps; however, the hemostats and the office were not long enough to reach " the lower level of the IUD.  Next, I used an endocervical speculum to gently dilate the cervix.  This did allow for enough room to pass the uterine dressing forceps to the lower aspect of the IUD.  At this point, I was able to grasp one of the strings with the uterine dressing forceps, and I was able to remove the IUD with slow gentle traction.  At this point, the IUD was easily removed.  There is no signs of adherence of the IUD to the uterine wall.  IUD was removed intact and discarded. No complications. Patient tolerated the procedure well. She was instructed to continue to take a daily prenatal vitamin and to contact us when she has a positive pregnancy test

## 2019-02-05 ENCOUNTER — TELEPHONE (OUTPATIENT)
Dept: OBSTETRICS AND GYNECOLOGY | Facility: CLINIC | Age: 24
End: 2019-02-05

## 2019-02-05 NOTE — TELEPHONE ENCOUNTER
Patient called and stated that she started her period on 01/08/2019 - 01/14/2019, then again 01/21 and is still on it. She stated it is really thick and wondered if she should be concerned.       Thank you

## 2019-02-05 NOTE — TELEPHONE ENCOUNTER
I would just advise her to take a pregnancy test to be sure that it is negative.  If her pregnancy test is normal, she may make an appointment for GYN ultrasound and see me following the ultrasound.  If this resolves before the ultrasound and the patient wishes to cancel, that is fine too

## 2019-02-06 NOTE — TELEPHONE ENCOUNTER
Patient's pregnancy test was negative.  Patient aware due to limited ultrasound slots, the next available back to back appointments wouldn't be until 2/19.  Patient states she doesn't want to wait that long.  I explained to patient she would either need to go to two separate offices or have a large gap in between appointments.  Patient scheduled for a 3:00pm ultrasound at [place], then a 4:00pm here on 2/12.  If this isn't ok, please let me know and I can reschedule her until the week of the 18th.

## 2019-02-12 ENCOUNTER — TELEPHONE (OUTPATIENT)
Dept: OBSTETRICS AND GYNECOLOGY | Facility: CLINIC | Age: 24
End: 2019-02-12

## 2019-02-12 ENCOUNTER — PROCEDURE VISIT (OUTPATIENT)
Dept: OBSTETRICS AND GYNECOLOGY | Facility: CLINIC | Age: 24
End: 2019-02-12

## 2019-02-12 ENCOUNTER — OFFICE VISIT (OUTPATIENT)
Dept: OBSTETRICS AND GYNECOLOGY | Facility: CLINIC | Age: 24
End: 2019-02-12

## 2019-02-12 VITALS
DIASTOLIC BLOOD PRESSURE: 95 MMHG | HEART RATE: 89 BPM | BODY MASS INDEX: 27.44 KG/M2 | SYSTOLIC BLOOD PRESSURE: 146 MMHG | WEIGHT: 150 LBS

## 2019-02-12 DIAGNOSIS — N93.9 ABNORMAL UTERINE BLEEDING: Primary | ICD-10-CM

## 2019-02-12 DIAGNOSIS — Z11.3 SCREEN FOR STD (SEXUALLY TRANSMITTED DISEASE): ICD-10-CM

## 2019-02-12 DIAGNOSIS — N76.0 BACTERIAL VAGINITIS: ICD-10-CM

## 2019-02-12 DIAGNOSIS — N89.8 VAGINAL DISCHARGE: ICD-10-CM

## 2019-02-12 DIAGNOSIS — N92.6 IRREGULAR MENSES: Primary | ICD-10-CM

## 2019-02-12 DIAGNOSIS — B96.89 BACTERIAL VAGINITIS: ICD-10-CM

## 2019-02-12 PROBLEM — E28.2 PCO (POLYCYSTIC OVARIES): Status: ACTIVE | Noted: 2019-02-12

## 2019-02-12 PROBLEM — T83.32XA IUD THREADS LOST: Status: RESOLVED | Noted: 2018-10-31 | Resolved: 2019-02-12

## 2019-02-12 PROBLEM — Z01.419 ENCOUNTER FOR GYNECOLOGICAL EXAMINATION: Status: RESOLVED | Noted: 2018-10-19 | Resolved: 2019-02-12

## 2019-02-12 PROBLEM — Z30.432 ENCOUNTER FOR IUD REMOVAL: Status: RESOLVED | Noted: 2018-10-19 | Resolved: 2019-02-12

## 2019-02-12 PROCEDURE — 99214 OFFICE O/P EST MOD 30 MIN: CPT | Performed by: OBSTETRICS & GYNECOLOGY

## 2019-02-12 PROCEDURE — 76830 TRANSVAGINAL US NON-OB: CPT | Performed by: OBSTETRICS & GYNECOLOGY

## 2019-02-12 RX ORDER — METRONIDAZOLE 500 MG/1
500 TABLET ORAL 2 TIMES DAILY
Qty: 14 TABLET | Refills: 0 | Status: SHIPPED | OUTPATIENT
Start: 2019-02-12 | End: 2019-02-19

## 2019-02-12 NOTE — TELEPHONE ENCOUNTER
PT had a follow up appt today at 4:00 pm but was unable to drive from "[employer] to make appt. I have put PT on schedule for F/U 02/14/2019 at 1:15pm but will not be able to make this appt due to work. PT would like to know if you could go over results over the phone .       Thank you

## 2019-02-13 NOTE — PROGRESS NOTES
Thao Cutler is a 23 y.o. female.   Chief complaint: Irregular periods  History of Present Illness   Patient is here for evaluation of irregular menses.  She says it up until January 2019 her periods have been regular after the Mirena was removed.  She reports that she is trying to conceive.  She says that she had a period that began around 1/8/19 and lasted 5 days.  It stopped for about 2 days, and then the bleeding returned.  She reports that seems to start up more on the day following intercourse.  She has had leading based off and on since the middle of January.  She has some mild pelvic pain.  She denies fevers and chills.  No nausea or vomiting.  No dysuria.  Patient reports that she never had a positive pregnancy test at home    No current outpatient medications on file prior to visit.     No current facility-administered medications on file prior to visit.      Allergies   Allergen Reactions   • Shellfish-Derived Products      The following portions of the patient's history were reviewed and updated as appropriate: allergies, current medications, past family history, past medical history, past social history, past surgical history and problem list.    Review of Systems  General: No fever or chills  Constitutional: No weight loss or gain, no hair loss  HENT: No headache, no hearing loss, no tinnitus  Eyes: normal vision, no eye pain  Lungs: No cough, no shortness of breath  Heart: No chest pain, no palpitations  Abdomen: No nausea, vomiting, constipation or diarrhea  : No dysuria, no hematuria  Skin: No rashes  Lymph: No swelling  Neuro: No parathesia, no weakness  Psych: Normal though content, no hallucinations, no SI/HI    Objective   Physical Exam  Vitals:    02/12/19 1616   BP: 146/95   Pulse: 89   Weight: 68 kg (150 lb)     Gen: No acute distress, awake and oriented times three  Abdomen: soft, nontender, no masses or hernia, no hepatosplenomegaly, non distended, normoactive bowel  sounds  Pelvic: Exam performed in the presence of a female chaperone  Patient has provided verbal consent to proceed with exam.  Normal external female genitalia, no lesions  Urethra: Normal meatus, no caruncle  Bladder: nontender  Vagina: Mild amount of brown/bloody discharge noted, no lesions  Cervix: No cervical motion tenderness, no lesions, scant bleeding, nonfriable  Uterus: Anteverted, normal size and shape, nontender  Adnexa: No masses or tenderness  External anal exam: Normal appearance, no lesions or hemorrhoids  Rectal: Deferred  Psych: Good judgement and insight, normal affect and mood    Wet prep: Positive clue cells, no trichomonads, rare white blood cells, no yeast    Ultrasound today: Uterus 7 cm in maximum diameter.  Uterus is anteverted.  Endometrial lining 0.7 cm.  There is polycystic appearance to the ovaries.  The ovaries are otherwise normal with no masses.  There is some free fluid seen in the posterior cul-de-sac.    Assessment/Plan   Summer was seen today for follow-up.    Diagnoses and all orders for this visit:    Irregular menses    Screen for STD (sexually transmitted disease)    Vaginal discharge  -     NuSwab VG+ - Swab, Vagina    Bacterial vaginitis  -     metroNIDAZOLE (FLAGYL) 500 MG tablet; Take 1 tablet by mouth 2 (Two) Times a Day for 7 days.      Currently unclear reason for her sudden onset of irregular bleeding.  It may be post infectious in nature.  Cultures were obtained.  She appears to at least have bacterial vaginosis today.  We will begin treatment of this empirically with metronidazole.  We will await culture results.  There is a PCO appearance to the ovaries on ultrasound today, but before last month she has not had any menstrual irregularities, so it may not be related to PCO.  If her cultures are negative, or if there is no improvement after treatment with metronidazole, I would consider starting cyclic Provera to try to help with cycle regulation.  If the patient is  trying to get pregnant, she should be on prenatal vitamins.  She verbalizes understanding.     I spent 15 out of 25 minutes with the patient in face to face counseling of the above issues.

## 2019-02-15 ENCOUNTER — TELEPHONE (OUTPATIENT)
Dept: OBSTETRICS AND GYNECOLOGY | Facility: CLINIC | Age: 24
End: 2019-02-15

## 2019-02-15 LAB
A VAGINAE DNA VAG QL NAA+PROBE: ABNORMAL SCORE
BVAB2 DNA VAG QL NAA+PROBE: ABNORMAL SCORE
C ALBICANS DNA VAG QL NAA+PROBE: NEGATIVE
C GLABRATA DNA VAG QL NAA+PROBE: NEGATIVE
C TRACH RRNA SPEC QL NAA+PROBE: NEGATIVE
MEGA1 DNA VAG QL NAA+PROBE: ABNORMAL SCORE
N GONORRHOEA RRNA SPEC QL NAA+PROBE: NEGATIVE
T VAGINALIS RRNA SPEC QL NAA+PROBE: NEGATIVE

## 2019-02-15 NOTE — TELEPHONE ENCOUNTER
Pt aware. JEF      ----- Message from James Levy MD sent at 2/15/2019  8:12 AM EST -----  Notify pt that her cultures only showed BV. I prescribed metronidazole at her last visit. If this fixes her bleeding, we do not need anything further. If not, have her call us back and  Will start her on oral provera.

## 2019-04-05 ENCOUNTER — LAB REQUISITION (OUTPATIENT)
Dept: LAB | Facility: OTHER | Age: 24
End: 2019-04-05

## 2019-04-05 DIAGNOSIS — Z00.00 ANNUAL PHYSICAL EXAM: ICD-10-CM

## 2019-04-05 LAB — HBV SURFACE AB SER RIA-ACNC: NORMAL

## 2019-04-05 PROCEDURE — 86706 HEP B SURFACE ANTIBODY: CPT | Performed by: PHYSICAL MEDICINE & REHABILITATION

## 2019-04-05 PROCEDURE — 86762 RUBELLA ANTIBODY: CPT | Performed by: PHYSICAL MEDICINE & REHABILITATION

## 2019-04-05 PROCEDURE — 86735 MUMPS ANTIBODY: CPT | Performed by: PHYSICAL MEDICINE & REHABILITATION

## 2019-04-05 PROCEDURE — 86787 VARICELLA-ZOSTER ANTIBODY: CPT | Performed by: PHYSICAL MEDICINE & REHABILITATION

## 2019-04-05 PROCEDURE — 86765 RUBEOLA ANTIBODY: CPT | Performed by: PHYSICAL MEDICINE & REHABILITATION

## 2019-04-07 LAB
MEV IGG SER IA-ACNC: POSITIVE
MUV IGG SER IA-ACNC: POSITIVE
RUBV IGG SERPL IA-ACNC: POSITIVE
VZV IGG SER IA-ACNC: POSITIVE

## 2019-08-29 ENCOUNTER — TELEPHONE (OUTPATIENT)
Dept: OBSTETRICS AND GYNECOLOGY | Facility: CLINIC | Age: 24
End: 2019-08-29

## 2019-08-29 DIAGNOSIS — Z34.90 PREGNANCY WITH FETUS OF UNKNOWN GESTATIONAL AGE: Primary | ICD-10-CM

## 2019-08-29 NOTE — TELEPHONE ENCOUNTER
Patient is welcome to come in and get an hCG level. (Will likely need more labs at new OB visit) Depending on that level, we may ask her to come back for another level or time and ultrasound appropriately.  If she has bleeding or pain, she will call back.

## 2019-08-29 NOTE — TELEPHONE ENCOUNTER
Pt is scheduled to see you for her initial pregnancy visit on Tuesday. She is concerned because she thinks she is approximately 6 weeks pregnant and hasn't had any nausea or vomiting like she did with her previous pregnancies. She is not sure if this might be a sign of miscarriage, which she has had before. Please advise.

## 2019-09-03 ENCOUNTER — INITIAL PRENATAL (OUTPATIENT)
Dept: OBSTETRICS AND GYNECOLOGY | Facility: CLINIC | Age: 24
End: 2019-09-03

## 2019-09-03 ENCOUNTER — TELEPHONE (OUTPATIENT)
Dept: OBSTETRICS AND GYNECOLOGY | Facility: CLINIC | Age: 24
End: 2019-09-03

## 2019-09-03 VITALS — DIASTOLIC BLOOD PRESSURE: 76 MMHG | WEIGHT: 146 LBS | SYSTOLIC BLOOD PRESSURE: 129 MMHG | BODY MASS INDEX: 26.7 KG/M2

## 2019-09-03 DIAGNOSIS — Z34.80 SUPERVISION OF OTHER NORMAL PREGNANCY, ANTEPARTUM: Primary | ICD-10-CM

## 2019-09-03 LAB
B-HCG UR QL: POSITIVE
INTERNAL NEGATIVE CONTROL: NEGATIVE
INTERNAL POSITIVE CONTROL: POSITIVE
Lab: ABNORMAL

## 2019-09-03 PROCEDURE — 81025 URINE PREGNANCY TEST: CPT | Performed by: OBSTETRICS & GYNECOLOGY

## 2019-09-03 PROCEDURE — 99214 OFFICE O/P EST MOD 30 MIN: CPT | Performed by: OBSTETRICS & GYNECOLOGY

## 2019-09-03 RX ORDER — PNV NO.95/FERROUS FUM/FOLIC AC 28MG-0.8MG
1 TABLET ORAL DAILY
Qty: 30 TABLET | Refills: 11 | Status: CANCELLED | OUTPATIENT
Start: 2019-09-03

## 2019-09-03 RX ORDER — BUSPIRONE HYDROCHLORIDE 7.5 MG/1
TABLET ORAL
COMMUNITY
Start: 2019-08-01

## 2019-09-03 RX ORDER — ALBUTEROL SULFATE 90 UG/1
2 AEROSOL, METERED RESPIRATORY (INHALATION)
COMMUNITY

## 2019-09-03 NOTE — TELEPHONE ENCOUNTER
Ob pt called to report she spoke with her insurance, they will approve her to do the InformaSeq screening, as long as a prior authorization is obtained.  If approved, pt would like to have blood draw on or before 9/10/19, her next scheduled appt.     Pt states [employer] medicaid will NOT pay for CPT's 70567 or 91806.    Pt # [phone]

## 2019-09-03 NOTE — PROGRESS NOTES
"Initial OB Visit    Chief Complaint   Patient presents with   • Initial Prenatal Visit     pt reports off and on cramping, denies nausea.       Latosha Cutler is being seen today for her first obstetrical visit.  She is a 24 y.o.    7w6d gestation, based on irregular menses of 7/10/19.  H/o fibroids.   FOB: boyfriend, involved  This is not a planned pregnancy.   OB History    Para Term  AB Living   4 2 2   1 2   SAB TAB Ectopic Molar Multiple Live Births   1 0       2      # Outcome Date GA Lbr Toy/2nd Weight Sex Delivery Anes PTL Lv   4 Current            3 Term 06/10/15 39w0d  2920 g (6 lb 7 oz) M CS-LTranv   SUSIE   2 Term 13 37w0d  3033 g (6 lb 11 oz) M CS-LTranv   SUSIE      Complications: Severe pre-eclampsia in third trimester   1 SAB      SAB             Current obstetric complaints: denies   Prior obstetric issues, potential pregnancy concerns: preeclampsia with severe features in G1 (+jaundice, vision changes), G2 was  and had hernia; SAB without D&C or medication  Family history of genetic issues (includes FOB): denies  Prior infections concerning in pregnancy (Rash, fever since LMP): denies  Varicella Hx: Reports immune by history  Prior genetic testing: denies  History of abnormal pap smears: denies - but appears had LSIL in 2018  History of STIs: chlamydia  History of HSV in self or partner? denies      Past Medical History:   Diagnosis Date   • Anxiety    • Asthma    • Chlamydia    • Depression    • Urinary tract infection    Uses albuterol inhaler on occasion, < 1 x per week  Reports \"calcifications\" in kidneys that cause infections about once per year    Past Surgical History:   Procedure Laterality Date   •  SECTION           Current Outpatient Medications:   •  busPIRone (BUSPAR) 7.5 MG tablet, , Disp: , Rfl:   •  albuterol sulfate  (90 Base) MCG/ACT inhaler, Inhale 2 puffs., Disp: , Rfl:   •  Peak Flow Meter device, 1 Device Daily., Disp: 1 each, Rfl: " 0    Allergies   Allergen Reactions   • Povidone Iodine Other (See Comments)     Unsure - but has strong anaphlaxis to shellfish & was told not to use betadine   • Shellfish-Derived Products Swelling       Social History     Socioeconomic History   • Marital status: Single     Spouse name: Not on file   • Number of children: Not on file   • Years of education: Not on file   • Highest education level: Not on file   Tobacco Use   • Smoking status: Current Every Day Smoker     Packs/day: 0.50     Types: Cigarettes   • Smokeless tobacco: Never Used   Substance and Sexual Activity   • Alcohol use: No   • Drug use: No   • Sexual activity: Yes     Partners: Male       Family History   Problem Relation Age of Onset   • Endometriosis Sister    • Breast cancer Neg Hx    • Ovarian cancer Neg Hx    • Uterine cancer Neg Hx    • Colon cancer Neg Hx    • Deep vein thrombosis Neg Hx    • Pulmonary embolism Neg Hx        Review of systems     Constitutional: negative for chills, fevers and negative for fatigue  Eyes: negative  Ears, nose, mouth, throat, and face: negative for hearing loss and nasal congestion  Respiratory: negative for asthma and wheezing  Cardiovascular: negative for chest pain and dyspnea  Gastrointestinal: negative for dyspepsia, dysphagia abdominal pain  Genitourinary:negative for urinary incontinence  Integument/breast: negative for breast lump  Hematologic/lymphatic: negative for bleeding  Musculoskeletal:negative for aches  Neurological: negative for numbness/tingling  Behavioral/Psych: negative for anhedonia  Allergic/Immunologic: negative for rash, allergy         Objective    /76   Wt 66.2 kg (146 lb)   LMP 07/10/2019 (Exact Date)   BMI 26.70 kg/m²       General Appearance:    Alert, cooperative, in no acute distress, habitus normal   Head:    Normocephalic, without obvious abnormality, atraumatic   Eyes:            Lids and lashes normal, conjunctivae and sclerae normal, no   icterus, no pallor,  corneas clear   Ears:    Ears appear intact with no abnormalities noted       Neck:   No adenopathy, supple, trachea midline, no thyromegaly   Back:     No kyphosis present, no scoliosis present,                       Lungs:     Clear to auscultation,respirations regular, even and                   unlabored    Heart:    Regular rhythm and normal rate, normal S1 and S2, no            murmur, no gallop, no rub, no click   Breast Exam:    No masses, No nipple discharge   Abdomen:     Normal bowel sounds, no masses, no organomegaly, soft        non-tender, non-distended, no guarding, no rebound                 Tenderness, no hernia   Genitalia:    Vulva - BUS-WNL, NEFG    Vagina - No discharge, No bleeding    Cervix - No Lesions, closed     Uterus - Consistent with 7 weeks    Adnexa - No masses, NT    Pelvimetry - clinically adequate, gynecoid pelvis     Extremities:   Moves all extremities well, no edema, no cyanosis, no              redness   Pulses:   Pulses palpable and equal bilaterally   Skin:   No bleeding, bruising or rash   Lymph nodes:   No palpable adenopathy   Neurologic:   Sensation intact, A&O times 3      Assessment  Pregnancy at 7w6d  Smoking  H/o preeclampsia in G1  H/o CS  Asthma, intermittent  H/o umbilical hernia  H/o depression and anxiety - on Buspar     Plan    Initial labs drawn, GC/CHL screen done  Patient is on Prenatal vitamins  Problem list reviewed and updated.  Reviewed routine prenatal care with the office to include but not limited to:   Zika (travel restrictions/ok to use insect repellant), not to changing cat litter, food restrictions, avoidance of alcohol, tobacco and drugs and saunas/hot tubs, anticipated weight gain/nutrition requirements.  Reviewed nature of practice and hospital.  Reviewed recommended follow up, importance of compliance with care. We reviewed testing in pregnancy including HIV testing and urine drug screen.    Reviewed aneuploidy screening and CF/SMA screening.   We reviewed limitations of testing, possibility of false positive/negative results, possible need for other tests as indicated.  She is considering these tests and will let us know at the next visit  Counseled on limitations of ultrasound in pregnancy in detecting aneuploidy/fetal anomalies    Asthma, intermittent: Uses albuterol only occasionally.  Discussed use of peak flow in pregnancy.  Discussed dyspnea pregnancy, possible need for daily medication if frequently using albuterol.    History of umbilical hernia: Patient reports she had an umbilical hernia noted in her last pregnancy.  Dr. Levy took her off for light duty (lifting less than 10 pounds).  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  She is having no pain and there is no evidence of a hernia at this time.  We discussed that as pregnancy continues, it is possible that the gravid uterus will worsen the hernia and we may need to adjust these instructions.  For now, continue regular employment.  History of depression and anxiety: Discussed risks and benefits of BuSpar in pregnancy.  Patient will continue    Smoking in pregnancy: On half pack per day, recommend cessation.  Patient has goal to quit by 16 weeks by tapering use.    History of preeclampsia and G1: Patient had no preeclampsia in her second gestation.  We discussed use of aspirin for preeclampsia prevention.  We will need to get baseline labs.    We reviewed that at this time, her BMI is classified as BMI 25.0-29.9        Classification: overweight.  We reviewed that this is classified as overweight for age.  In pregnancy, her recommended weight gain is 15-25lbs.  In the first trimester, caloric demand is typically not increased.  In the second and third trimester, the increased demand is approximately 350 and 450 calories respectively.    All questions answered.   Return in about 1 week (around 9/10/2019) for dating US, 4 weeks OB visit.      Yoli Andres MD

## 2019-09-03 NOTE — PATIENT INSTRUCTIONS
The following are CPT codes for the optional tests in pregnancy:  Cystic fibrosis screenin  Spinal Muscular atrophy screening 29989  InformaSeq with XY (aneuploidy screening) 78382    The ICD 10 code for most pregnancies is Z34.90     Travel During Pregnancy:  • Always use seatbelts.  A lap belt should be worn below the abdomen (across the hips) and the shoulder belt should be worn across the center of your chest (between the breasts) away from your neck.  Do not put the shoulder belt under your arm or behind your back.  Pull any slack out of the belt.  • Air travel is safe in most uncomplicated pregnancies, but we do not recommend air travel past 36 weeks.  Airlines may also have restrictions, so check with your airline before flying.  For some international flights, the travel cut off may be as early as 28 weeks gestation, and some airlines may require letters from your physician.  • When going on long trips in car, plane, train, or bus, frequent ambulation is important to prevent blood clots in legs and/or lungs.  The following may help with prevention of blood clots in legs: Drink lots of fluid, wear loose-fitting clothing, walk and stretch at regular intervals.    • Avoid areas with Zika outbreaks.  For the latest information, you may visit: www.cdc.gov/travel/notices/.  Resources: , , Committee Opinion 455  Nutrition during pregnancy  • Average weight gain during pregnancy is based on your pre-pregnancy body mass index (BMI).  See below for recommended weight gain:  o Underweight (BMI <18.5), we recommend 28 to 40lb weight gain  o Normal weight (BMI 18.5 to 24.9), we recommend a 25 to 35lb weight gain  o Overweight (BMI 25-29.9), we recommend a 15 to 25lb weight gain  o Obese (BMI >30), we recommend keeping weight gain under 20 lbs.    • You should speak with your physician regarding your specific weight goals for this pregnancy.   • Foods to avoid include:   o Fish: avoid certain types  of fish such as shark, swordfish, dariel mackerel, tilefish.  Limit white (albacore) tuna to 6 oz per week.  Choose fish and shellfish such as shrimp, salmon, catfish, Aylett.  o Food-borne illness: Pregnant women are much more likely to get Listeriosis than non-pregnant women.  To help prevent this, avoid eating unpasteurized milk and unpasteurized milk products, hot dogs/lunch meat/cold cuts unless they are heated until steaming right before serving, refrigerated meat spreads, refrigerated smoked seafood, raw or undercooked seafood/eggs/meat.   • Vitamin D helps development of the baby’s bones and teeth.  Good sources of Vitamin D include milk fortified with Vitamin D and fatty fish such as salmon.    • Folic acid, also known as folate, helps develop the baby’s brain and spine.  You should make sure your vitamin contains extra folic acid - at least 400mcg.    • Iron helps make red blood cells.  You need to make extra red blood cell sin pregnancy.  We recommend eating Iron-rich foods such as lean red meat, poultry, fish, dried beans and peas, iron-fortified cereals, and prune juice.  You may be recommended an iron supplement.  If so, it is absorbed more easily if taken with vitamin C-rich foods such as citrus fruits or tomatoes.    • It is important to eat a well balanced diet.  A good recourse for nutrition recommendations is: www.choosemyplate.gov.  • Limit caffeine intake to 200mg daily.  Some coffees/teas/sodas have very different levels of caffeine per serving, so check the nutrition labeling.   Resources: , Committee Opinion 548  Exercise during pregnancy  • If you are healthy and your pregnancy is normal, it is safe to continue or start most types of exercise.   Physical activity does not increase your risk of miscarriage, low birth weight or early delivery, but you should discuss specific limitations or any complications with your physician.    • Benefits of exercise during pregnancy include: reduced  back pain, less constipation, promotes overall health and healthy weight gain which may decrease risks for certain pregnancy complications such as diabetes and/or preeclampsia.  • The CDC recommends 150 minutes of moderate intensity aerobic exercise per week.  Moderate intensity means that you are moving enough to raise your heart rate and start sweating, but you can talk normally.  Brisk walking, swimming/water workouts, modified yoga/pilates, and use of elliptical machines and/or stationary bikes are examples of aerobic activity.    • Precautions:  o Stay hydrated.  Drink plenty of water before, during and after your workout  o Wear supportive clothing such as a sports bra  o Do not become overheated.  Do not exercise outside when it is very hot or humid  o Avoid lying flat on your back as much as possible  o AVOID contact sports, skydiving, sports that risk falling (such as skiing, surfing, off-road cycling, gymnastics, horseback riding), hot yoga/hot pilates, scuba diving  • Stop exercising if you experience: bleeding from the vagina, feeling dizzy/faint, shortness of breath before starting exercise, chest pain, headache, muscle weakness, calf pain or swelling, regular uterine contractions, fluid leaking from the vagina.    • Postpartum exercise: Continuing exercise after you deliver your baby will help boost your energy, strengthen muscles, promote better sleep, and relieve stress.  It also may be useful in preventing postpartum depression.  150 minutes of moderate-intensity aerobic activity is recommended.  Types of exercise and when you can start a regular exercise routine may be limited by the type of delivery you had.  Please discuss with your physician prior to resuming or starting exercise.    Resources: ,   Back pain during pregnancy  • Back pain is very common during pregnancy.  It may arise due to strain on your back muscles, weakness of the abdominal muscles, and pregnancy hormones.   "  • To prevent back pain:  o Wear shoes with good support. Flat shoes and high heels may not have good arch support.  o Consider a firm mattress  o Use good lifting practices.  Do not bend from the waist to pick things up.  Squat down and bend your knees, keeping a straight back.  o Sit in chairs with good back support or use a small pillow behind your lower back.    o Sleep on your side with one or two pillows between your legs  • To ease back pain:   o Exercise can help stretch strained muscles and strengthen weak muscles to promote good posture  • Contact your health care provider with severe pain, pain that persists for more than 2 weeks, fever, burning during urination, or vaginal bleeding.  Resources:     Nausea/vomiting in pregnancy:  To help with nausea and vomiting you may try the following over the counter products:  Reta chews, reta tea, reta gum  Mint tea, peppermint gum or candy  B6/Doxylamine: to be taken daily, not just when symptoms occur  Pyridoxine (also known as B6): 10 to 25 mg orally every six to eight hours; the maximum treatment dose suggested for pregnant women is 200 mg/day.  Doxylamine (available in some over-the-counter sleeping pills (eg, Unisom Sleep Tabs) and as an antihistamine chewable tablet (Aldex AN)). One-half of the 25 mg over-the-counter tablet or two chewable 5 mg tablets can be used off-label as an antiemetic  · The Association of Profess[employer] of Gyne[employer] and Obstetric[employer] has released a smart phone application that can help you monitor and manage your symptoms.  The Application is \"Managing NVP,\" and has a green icon that says \"APGO WELLMOM.\"    If these do not work, we may need to try prescription medications.    Please contact us if you are unable to tolerate liquids (even sips of water) for over six to eight hours, have weight loss of over 10% of your body weight, blood in vomit, fever (temp of 100.4 or higher), or other concerning symptoms arise.            "

## 2019-09-03 NOTE — TELEPHONE ENCOUNTER
A prior authorization (as we discussed) typically means a medical indication such as advanced maternal age (age over 35) or ultrasound finding worrisome for increased aneuploidy risk. Need to confirm GA by US and then we can discuss in person at next visit.

## 2019-09-04 LAB
ABO GROUP BLD: (no result)
AMPHETAMINES UR QL SCN: NEGATIVE NG/ML
BARBITURATES UR QL SCN: NEGATIVE NG/ML
BASOPHILS # BLD AUTO: 0.1 X10E3/UL (ref 0–0.2)
BASOPHILS NFR BLD AUTO: 1 %
BENZODIAZ UR QL: NEGATIVE NG/ML
BLD GP AB SCN SERPL QL: NEGATIVE
BZE UR QL: NEGATIVE NG/ML
CANNABINOIDS UR QL SCN: NEGATIVE NG/ML
EOSINOPHIL # BLD AUTO: 0.2 X10E3/UL (ref 0–0.4)
EOSINOPHIL NFR BLD AUTO: 2 %
ERYTHROCYTE [DISTWIDTH] IN BLOOD BY AUTOMATED COUNT: 13.8 % (ref 12.3–15.4)
HBV SURFACE AG SERPL QL IA: NEGATIVE
HCT VFR BLD AUTO: 38.9 % (ref 34–46.6)
HCV AB S/CO SERPL IA: <0.1 S/CO RATIO (ref 0–0.9)
HGB BLD-MCNC: 13.3 G/DL (ref 11.1–15.9)
HIV 1+2 AB+HIV1 P24 AG SERPL QL IA: NON REACTIVE
IMM GRANULOCYTES # BLD AUTO: 0 X10E3/UL (ref 0–0.1)
IMM GRANULOCYTES NFR BLD AUTO: 0 %
LYMPHOCYTES # BLD AUTO: 2.7 X10E3/UL (ref 0.7–3.1)
LYMPHOCYTES NFR BLD AUTO: 34 %
MCH RBC QN AUTO: 31.4 PG (ref 26.6–33)
MCHC RBC AUTO-ENTMCNC: 34.2 G/DL (ref 31.5–35.7)
MCV RBC AUTO: 92 FL (ref 79–97)
METHADONE UR QL SCN: NEGATIVE NG/ML
MONOCYTES # BLD AUTO: 0.7 X10E3/UL (ref 0.1–0.9)
MONOCYTES NFR BLD AUTO: 9 %
NEUTROPHILS # BLD AUTO: 4.4 X10E3/UL (ref 1.4–7)
NEUTROPHILS NFR BLD AUTO: 54 %
OPIATES UR QL: NEGATIVE NG/ML
PCP UR QL: NEGATIVE NG/ML
PLATELET # BLD AUTO: 277 X10E3/UL (ref 150–450)
PROPOXYPH UR QL SCN: NEGATIVE NG/ML
RBC # BLD AUTO: 4.23 X10E6/UL (ref 3.77–5.28)
RH BLD: POSITIVE
RPR SER QL: NON REACTIVE
RUBV IGG SERPL IA-ACNC: 2.45 INDEX
WBC # BLD AUTO: 8 X10E3/UL (ref 3.4–10.8)

## 2019-09-05 LAB
BACTERIA UR CULT: NO GROWTH
BACTERIA UR CULT: NORMAL
C TRACH RRNA VAG QL NAA+PROBE: NEGATIVE
CONV .: NORMAL
CYTOLOGIST CVX/VAG CYTO: NORMAL
CYTOLOGY CVX/VAG DOC CYTO: NORMAL
CYTOLOGY CVX/VAG DOC THIN PREP: NORMAL
DX ICD CODE: NORMAL
HIV 1 & 2 AB SER-IMP: NORMAL
Lab: NORMAL
N GONORRHOEA RRNA VAG QL NAA+PROBE: NEGATIVE
OTHER STN SPEC: NORMAL
STAT OF ADQ CVX/VAG CYTO-IMP: NORMAL
T VAGINALIS RRNA VAG QL NAA+PROBE: NEGATIVE

## 2019-09-05 NOTE — TELEPHONE ENCOUNTER
Pt informed of protocol for prior auth's for genetic screenings.    Pt asked for her lab results from 9/3/19 visit.      Pt # [phone]

## 2019-09-05 NOTE — TELEPHONE ENCOUNTER
Pap smear was NIL, Gonorrhea/chlamydia/trichomonas was negative, blood work was normal, Urine culture and urine drug screen were normal.

## 2019-09-05 NOTE — TELEPHONE ENCOUNTER
You are correct - no hcg. The ultrasound on 9/10 will be a better indicator of her gestational age than an hcg will be.

## 2019-09-10 ENCOUNTER — PROCEDURE VISIT (OUTPATIENT)
Dept: OBSTETRICS AND GYNECOLOGY | Facility: CLINIC | Age: 24
End: 2019-09-10

## 2019-09-10 DIAGNOSIS — Z34.91 PREGNANCY WITH UNCERTAIN DATES IN FIRST TRIMESTER: Primary | ICD-10-CM

## 2019-09-10 PROCEDURE — 76817 TRANSVAGINAL US OBSTETRIC: CPT | Performed by: OBSTETRICS & GYNECOLOGY

## 2019-09-14 NOTE — TELEPHONE ENCOUNTER
Pt informed.  She would also like to know her Hcg levels, but it does not look like that was drawn.  Please verify.    Pt # [phone]  Ok to leave a msg.     Patient complains of cough and congestion

## 2019-09-16 ENCOUNTER — TELEPHONE (OUTPATIENT)
Dept: OBSTETRICS AND GYNECOLOGY | Facility: CLINIC | Age: 24
End: 2019-09-16

## 2019-09-16 ENCOUNTER — HOSPITAL ENCOUNTER (EMERGENCY)
Facility: HOSPITAL | Age: 24
Discharge: HOME | End: 2019-09-16
Attending: EMERGENCY MEDICINE | Admitting: EMERGENCY MEDICINE

## 2019-09-16 ENCOUNTER — APPOINTMENT (OUTPATIENT)
Dept: ULTRASOUND IMAGING | Facility: HOSPITAL | Age: 24
End: 2019-09-16

## 2019-09-16 VITALS
RESPIRATION RATE: 17 BRPM | BODY MASS INDEX: 26.87 KG/M2 | WEIGHT: 146 LBS | HEIGHT: 62 IN | TEMPERATURE: 98.7 F | HEART RATE: 96 BPM | SYSTOLIC BLOOD PRESSURE: 121 MMHG | DIASTOLIC BLOOD PRESSURE: 72 MMHG | OXYGEN SATURATION: 98 %

## 2019-09-16 DIAGNOSIS — O03.9 SPONTANEOUS MISCARRIAGE: Primary | ICD-10-CM

## 2019-09-16 LAB
BASOPHILS # BLD AUTO: 0.08 10*3/MM3 (ref 0–0.2)
BASOPHILS NFR BLD AUTO: 1 % (ref 0–1.5)
BILIRUB UR QL STRIP: NEGATIVE
CLARITY UR: CLEAR
CLUE CELLS SPEC QL WET PREP: NORMAL
COLOR UR: YELLOW
DEPRECATED RDW RBC AUTO: 52.4 FL (ref 37–54)
EOSINOPHIL # BLD AUTO: 0.1 10*3/MM3 (ref 0–0.4)
EOSINOPHIL NFR BLD AUTO: 1.2 % (ref 0.3–6.2)
ERYTHROCYTE [DISTWIDTH] IN BLOOD BY AUTOMATED COUNT: 14.8 % (ref 12.3–15.4)
GLUCOSE UR STRIP-MCNC: NEGATIVE MG/DL
HCG INTACT+B SERPL-ACNC: 3574 MIU/ML
HCT VFR BLD AUTO: 47.4 % (ref 34–46.6)
HGB BLD-MCNC: 15.1 G/DL (ref 12–15.9)
HGB UR QL STRIP.AUTO: NEGATIVE
HOLD SPECIMEN: NORMAL
HOLD SPECIMEN: NORMAL
HYDATID CYST SPEC WET PREP: NORMAL
IMM GRANULOCYTES # BLD AUTO: 0.03 10*3/MM3 (ref 0–0.05)
IMM GRANULOCYTES NFR BLD AUTO: 0.4 % (ref 0–0.5)
KETONES UR QL STRIP: NEGATIVE
LEUKOCYTE ESTERASE UR QL STRIP.AUTO: NEGATIVE
LYMPHOCYTES # BLD AUTO: 2.33 10*3/MM3 (ref 0.7–3.1)
LYMPHOCYTES NFR BLD AUTO: 28.1 % (ref 19.6–45.3)
MCH RBC QN AUTO: 30.8 PG (ref 26.6–33)
MCHC RBC AUTO-ENTMCNC: 31.9 G/DL (ref 31.5–35.7)
MCV RBC AUTO: 96.7 FL (ref 79–97)
MONOCYTES # BLD AUTO: 0.63 10*3/MM3 (ref 0.1–0.9)
MONOCYTES NFR BLD AUTO: 7.6 % (ref 5–12)
NEUTROPHILS # BLD AUTO: 5.13 10*3/MM3 (ref 1.7–7)
NEUTROPHILS NFR BLD AUTO: 61.7 % (ref 42.7–76)
NITRITE UR QL STRIP: NEGATIVE
NRBC BLD AUTO-RTO: 0 /100 WBC (ref 0–0.2)
PH UR STRIP.AUTO: 7 [PH] (ref 5–8)
PLATELET # BLD AUTO: 237 10*3/MM3 (ref 140–450)
PMV BLD AUTO: 10.8 FL (ref 6–12)
PROT UR QL STRIP: NEGATIVE
RBC # BLD AUTO: 4.9 10*6/MM3 (ref 3.77–5.28)
SP GR UR STRIP: 1.02 (ref 1–1.03)
T VAGINALIS SPEC QL WET PREP: NORMAL
UROBILINOGEN UR QL STRIP: NORMAL
WBC NRBC COR # BLD: 8.3 10*3/MM3 (ref 3.4–10.8)
WBC SPEC QL WET PREP: NORMAL
WHOLE BLOOD HOLD SPECIMEN: NORMAL
YEAST GENITAL QL WET PREP: NORMAL

## 2019-09-16 PROCEDURE — 88305 TISSUE EXAM BY PATHOLOGIST: CPT | Performed by: EMERGENCY MEDICINE

## 2019-09-16 PROCEDURE — 84702 CHORIONIC GONADOTROPIN TEST: CPT | Performed by: EMERGENCY MEDICINE

## 2019-09-16 PROCEDURE — 81003 URINALYSIS AUTO W/O SCOPE: CPT | Performed by: EMERGENCY MEDICINE

## 2019-09-16 PROCEDURE — 87591 N.GONORRHOEAE DNA AMP PROB: CPT | Performed by: EMERGENCY MEDICINE

## 2019-09-16 PROCEDURE — 85025 COMPLETE CBC W/AUTO DIFF WBC: CPT | Performed by: EMERGENCY MEDICINE

## 2019-09-16 PROCEDURE — 99284 EMERGENCY DEPT VISIT MOD MDM: CPT

## 2019-09-16 PROCEDURE — 76815 OB US LIMITED FETUS(S): CPT

## 2019-09-16 PROCEDURE — 76817 TRANSVAGINAL US OBSTETRIC: CPT

## 2019-09-16 PROCEDURE — P9612 CATHETERIZE FOR URINE SPEC: HCPCS

## 2019-09-16 PROCEDURE — 87210 SMEAR WET MOUNT SALINE/INK: CPT | Performed by: EMERGENCY MEDICINE

## 2019-09-16 PROCEDURE — 87491 CHLMYD TRACH DNA AMP PROBE: CPT | Performed by: EMERGENCY MEDICINE

## 2019-09-16 PROCEDURE — 36415 COLL VENOUS BLD VENIPUNCTURE: CPT

## 2019-09-16 RX ORDER — NORETHINDRONE ACETATE AND ETHINYL ESTRADIOL 1MG-20(21)
1 KIT ORAL DAILY
Qty: 28 TABLET | Refills: 12 | Status: SHIPPED | OUTPATIENT
Start: 2019-09-16 | End: 2019-09-30

## 2019-09-16 RX ORDER — SODIUM CHLORIDE 0.9 % (FLUSH) 0.9 %
10 SYRINGE (ML) INJECTION AS NEEDED
Status: DISCONTINUED | OUTPATIENT
Start: 2019-09-16 | End: 2019-09-16 | Stop reason: HOSPADM

## 2019-09-16 NOTE — ED NOTES
Pt reports being 7 week pregnant and having vaginal bleeding since yesterday. Seen at [facility] yesterday and sent home. Woke up bleeding again this morning.      Ney Jacobsen RN  09/16/19 3158

## 2019-09-16 NOTE — ED PROVIDER NOTES
EMERGENCY DEPARTMENT ENCOUNTER    CHIEF COMPLAINT  Chief Complaint: Vaginal bleeding  History given by: Pt  History limited by: none  Room Number: HALA/A  PMD: Jaden Holloway MD      HPI:  Pt is a 24 y.o. female, A1 and 7 weeks pregnant, who presents complaining of bright red vaginal bleeding that began yesterday. She states she had only one episode of spotting yesterday and has had increased bleeding since 0630 this am. She has gone through 2-3 pads today. She states she has had lower abdominal pain and pelvic cramping bilaterally, denies fever, chest pain, shortness of air, headache. She states this is worse than her normal period. She was seen at [facility] yesterday and had an US done that showed an IUP with a HR of 149. She reports increased urinary frequency for a few weeks but denies cough, fever, CP, SOA, or dysuria. She is a smoker. She states she has taken Tylenol for her pain. She has been taking her prenatals.     Duration:  Since yesterday  Onset: gradual  Timing: intermittent  Location: vagina  Radiation: none  Quality: bleeding  Intensity/Severity: moderate  Progression: worsening  Associated Symptoms: lower abd pain and pelvic cramping  Aggravating Factors: none  Alleviating Factors: none  Previous Episodes: n/a  Treatment before arrival: n/a    PAST MEDICAL HISTORY  Active Ambulatory Problems     Diagnosis Date Noted   • Encounter for tobacco use cessation counseling 10/19/2018   • Irregular menses 2019   • PCO (polycystic ovaries) 2019   • Asthma complicating pregnancy, antepartum 2013   • H/O:  06/10/2015     Resolved Ambulatory Problems     Diagnosis Date Noted   • Acute UTI 2016   • Vaginal discharge 2016   • Bacterial vaginosis 2016   • Breakthrough bleeding with IUD 04/10/2017   • Vaginal discharge 2017   • Exposure to STD 2017   • Screen for STD (sexually transmitted disease) 2017   • Screening for HIV (human immunodeficiency virus)  2017   • Encounter for IUD removal 2017   • Encounter for initial prescription of contraceptive pills 2017   • Acute gonorrhea of genitourinary tract 2017   • Encounter for IUD insertion 2018   • Candida vaginitis 2018   • Encounter for gynecological examination 10/19/2018   • Screening for cervical cancer 10/19/2018   • Screen for STD (sexually transmitted disease) 10/19/2018   • Family planning counseling 10/19/2018   • Encounter for IUD removal 10/19/2018   • Encounter for IUD removal 10/31/2018   • IUD threads lost 10/31/2018     Past Medical History:   Diagnosis Date   • Anxiety    • Asthma    • Chlamydia    • Depression    • Urinary tract infection        PAST SURGICAL HISTORY  Past Surgical History:   Procedure Laterality Date   •  SECTION         FAMILY HISTORY  Family History   Problem Relation Age of Onset   • Endometriosis Sister    • Breast cancer Neg Hx    • Ovarian cancer Neg Hx    • Uterine cancer Neg Hx    • Colon cancer Neg Hx    • Deep vein thrombosis Neg Hx    • Pulmonary embolism Neg Hx        SOCIAL HISTORY  Social History     Socioeconomic History   • Marital status: Single     Spouse name: Not on file   • Number of children: Not on file   • Years of education: Not on file   • Highest education level: Not on file   Tobacco Use   • Smoking status: Current Every Day Smoker     Packs/day: 0.50     Types: Cigarettes   • Smokeless tobacco: Never Used   Substance and Sexual Activity   • Alcohol use: No   • Drug use: No   • Sexual activity: Yes     Partners: Male       ALLERGIES  Povidone iodine and Shellfish-derived products    REVIEW OF SYSTEMS  Review of Systems   Constitutional: Negative for chills and fever.   HENT: Negative for rhinorrhea and sore throat.    Eyes: Negative for visual disturbance.   Respiratory: Negative for cough and shortness of breath.    Cardiovascular: Negative for chest pain, palpitations and leg swelling.   Gastrointestinal:  Positive for abdominal pain (cramping, lower). Negative for diarrhea and vomiting.   Endocrine: Negative.    Genitourinary: Positive for pelvic pain and vaginal bleeding. Negative for decreased urine volume, dysuria and frequency.   Musculoskeletal: Negative for neck pain.   Skin: Negative for rash.   Neurological: Negative for syncope and headaches.   Psychiatric/Behavioral: Negative.    All other systems reviewed and are negative.      PHYSICAL EXAM  ED Triage Vitals [09/16/19 0936]   Temp Heart Rate Resp BP SpO2   98.7 °F (37.1 °C) 116 17 128/81 98 %      Temp src Heart Rate Source Patient Position BP Location FiO2 (%)   Tympanic -- -- -- --       Physical Exam   Constitutional: She is oriented to person, place, and time.  Non-toxic appearance. She appears distressed (mild).   HENT:   Head: Normocephalic and atraumatic.   Eyes: EOM are normal.   Neck: Normal range of motion. Neck supple.   Cardiovascular: Normal rate, regular rhythm, normal heart sounds and intact distal pulses.   No murmur heard.  Pulses:       Posterior tibial pulses are 2+ on the right side, and 2+ on the left side.   Pulmonary/Chest: Effort normal. No respiratory distress. She has wheezes (occasional expiratory). She has no rhonchi. She has no rales.   Abdominal: Soft. Bowel sounds are normal. There is no tenderness. There is no rebound and no guarding.   Genitourinary: Vulva normal. Cervix exhibits no tenderness. Right adnexum displays no mass and no tenderness. Left adnexum displays no mass and no tenderness.   Genitourinary Comments: Cervix closed, uterus small, small amount of dark blood and suspected tissue cleared from the vaginal vault at the time of exam.   Musculoskeletal: Normal range of motion. She exhibits no edema.   Neurological: She is alert and oriented to person, place, and time.   Skin: Skin is warm and dry.   Psychiatric: Affect normal.   Nursing note and vitals reviewed.      LAB RESULTS  Lab Results (last 24 hours)      Procedure Component Value Units Date/Time    Tissue Pathology Exam [[id number]] Collected:  09/16/19 1030    Specimen:  Tissue from Uterus Updated:  09/16/19 1353    Urinalysis With Microscopic If Indicated (No Culture) - Urine, Catheter [[id number]]  (Normal) Collected:  09/16/19 1033    Specimen:  Urine, Catheter Updated:  09/16/19 1100     Color, UA Yellow     Appearance, UA Clear     pH, UA 7.0     Specific Gravity, UA 1.020     Glucose, UA Negative     Ketones, UA Negative     Bilirubin, UA Negative     Blood, UA Negative     Protein, UA Negative     Leuk Esterase, UA Negative     Nitrite, UA Negative     Urobilinogen, UA 1.0 E.U./dL    Narrative:       Urine microscopic not indicated.    Chlamydia trachomatis, Neisseria gonorrhoeae, PCR - Swab, Cervix [[id number]] Collected:  09/16/19 1033    Specimen:  Swab from Cervix Updated:  09/16/19 1048    Wet Prep, Genital - Swab, Vagina [[id number]]  (Normal) Collected:  09/16/19 1033    Specimen:  Swab from Vagina Updated:  09/16/19 1137     YEAST No yeast seen     HYPHAL ELEMENTS No Hyphal elements seen     WBC'S No WBC's seen     Clue Cells, Wet Prep No Clue cells seen     Trichomonas, Wet Prep No Trichomonas seen    CBC & Differential [[id number]] Collected:  09/16/19 1041    Specimen:  Blood Updated:  09/16/19 1057    Narrative:       The following orders were created for panel order CBC & Differential.  Procedure                               Abnormality         Status                     ---------                               -----------         ------                     CBC Auto Differential[[id number]]        Abnormal            Final result                 Please view results for these tests on the individual orders.    hCG, Quantitative, Pregnancy [[id number]] Collected:  09/16/19 1041    Specimen:  Blood Updated:  09/16/19 1111     HCG Quantitative 3,574.00 mIU/mL     Narrative:       HCG Ranges by Gestational Age    3 Weeks         5.4 -      72 mIU/mL  4  Weeks        10.2 -     708 mIU/mL  5 Weeks       217   -   8,245 mIU/mL  6 Weeks       152   -  32,177 mIU/mL  7 Weeks     4,059   - 153,767 mIU/mL  8 Weeks    31,366   - 149,094 mIU/mL  9 Weeks    59,109   - 135,901 mIU/mL  10 Weeks   44,186   - 170,409 mIU/mL  12 Weeks   27,107   - 201,615 mIU/mL  14 Weeks   24,302   -  93,646 mIU/mL  15 Weeks   12,540   -  69,747 mIU/mL  16 Weeks    8,904   -  55,332 mIU/mL  17 Weeks    8,240   -  51,793 mIU/mL  18 Weeks    9,649   -  55,271 mIU/mL    CBC Auto Differential [[id number]]  (Abnormal) Collected:  09/16/19 1041    Specimen:  Blood Updated:  09/16/19 1057     WBC 8.30 10*3/mm3      RBC 4.90 10*6/mm3      Hemoglobin 15.1 g/dL      Hematocrit 47.4 %      MCV 96.7 fL      MCH 30.8 pg      MCHC 31.9 g/dL      RDW 14.8 %      RDW-SD 52.4 fl      MPV 10.8 fL      Platelets 237 10*3/mm3      Neutrophil % 61.7 %      Lymphocyte % 28.1 %      Monocyte % 7.6 %      Eosinophil % 1.2 %      Basophil % 1.0 %      Immature Grans % 0.4 %      Neutrophils, Absolute 5.13 10*3/mm3      Lymphocytes, Absolute 2.33 10*3/mm3      Monocytes, Absolute 0.63 10*3/mm3      Eosinophils, Absolute 0.10 10*3/mm3      Basophils, Absolute 0.08 10*3/mm3      Immature Grans, Absolute 0.03 10*3/mm3      nRBC 0.0 /100 WBC           I ordered the above labs and reviewed the results    RADIOLOGY  US Ob Limited 1 + Fetuses   Final Result   1. No IUP is demonstrated. Per report there was an IUP demonstrated on   sonogram 1 day prior and the combination of findings is consistent with   a failed 1st trimester pregnancy. This could also be correlated with   clinical data and beta-hCG.   2. Complex left ovarian cyst measuring 1.8 cm.       Discussed with Dr. Kramer in the emergency department on 09/16/2019 at   12:00 PM.       This report was finalized on 9/16/2019 12:53 PM by Dr. Frederick Keith M.D.          US Ob Transvaginal    (Results Pending)   US Testicular or Ovarian Vascular Limited    (Results  Pending)        I ordered the above noted radiological studies. Interpreted by radiologist. Discussed with radiologist (Dr. Dean Keith). Reviewed by me in PACS.       PROCEDURES  Procedures      PROGRESS AND CONSULTS       1030  Pt recheck. Performed pelvic exam. extremal genitalia within normal limits. Blood and tissue in vaginal vault. No adnexal mass. Uterus small. Cervical os is closed.     1140  Per US tech, US shows no IUP and the uterus appears clean.     1200  Ham, US shows thin endometrial strip. no IUP.    1313  BETHANY Green at bedside. Discussed with Dr Andres who agrees to f/u in office with the pt.     1322  Rechecked pt. Pt is resting comfortably. Notified pt of lab work results, including HCG of 3.75k and CBC that shows a normal Hgb. Discussed the plan to discharge the pt home. I instructed the pt to f/u with OBGYN for a recheck. Advised pt to continue taking her prenatals and to drink plenty of fluids. Pt understands and agrees with the plan, all questions answered.        MEDICAL DECISION MAKING  Results were reviewed/discussed with the patient and they were also made aware of online access. Pt also made aware that some labs, such as cultures, will not be resulted during ER visit and follow up with PMD is necessary.     MDM  Number of Diagnoses or Management Options  Spontaneous miscarriage:      Amount and/or Complexity of Data Reviewed  Clinical lab tests: reviewed and ordered (HCG = 3.75k)  Tests in the radiology section of CPT®: reviewed and ordered (US shows thin endometrial strip. no IUP.)  Decide to obtain previous medical records or to obtain history from someone other than the patient: yes  Review and summarize past medical records: yes (Pt was seen at [facility] office on 3rd for her first appointment. The pt had an US done on the 10th that showed IUP. She had a fetal heart rate documented. due date was May 4th. Ovaries were normal. No free fluid. The pt is O+. Last drawn on  9/3/19. )           DIAGNOSIS  Final diagnoses:   Spontaneous miscarriage       DISPOSITION  DISCHARGE    Patient discharged in stable condition.    Reviewed implications of results, diagnosis, meds, responsibility to follow up, warning signs and symptoms of possible worsening, potential complications and reasons to return to ER.    Patient/Family voiced understanding of above instructions.    Discussed plan for discharge, as there is no emergent indication for admission. Patient referred to primary care provider for BP management due to today's BP. Pt/family is agreeable and understands need for follow up and repeat testing.  Pt is aware that discharge does not mean that nothing is wrong but it indicates no emergency is present that requires admission and they must continue care with follow-up as given below or physician of their choice.     FOLLOW-UP  Yoli Andres MD  [facility] [place] [facility] [place] [address]  [phone]    Schedule an appointment as soon as possible for a visit in 3 days  EVEN IF WELL         Medication List      New Prescriptions    norethindrone-ethinyl estradiol FE 1-20 MG-MCG per tablet  Commonly known as:  JUNEL FE 1/20  Take 1 tablet by mouth Daily.              Latest Documented Vital Signs:  As of 2:29 PM  BP- 121/72 HR- 96 Temp- 98.7 °F (37.1 °C) (Tympanic) O2 sat- 98%    --  Documentation assistance provided by winter Dhillon for Dr. Kramer.  Information recorded by the scribe was done at my direction and has been verified and validated by me.            Juan M Dhillon  09/16/19 2325       Judy Kramer MD  09/19/19 0117

## 2019-09-16 NOTE — TELEPHONE ENCOUNTER
Please see if she can be added on for an ultrasound this morning and to be seen in my afternoon office. If bleeding is heavy (through a pad an hour) or pain is severe, should go to ED. Recommend Taoism so we can take care of her if something is needed. Thanks!

## 2019-09-16 NOTE — TELEPHONE ENCOUNTER
Patient states that the pain is worse. Advised her to go to [facility] ER. She said she would.

## 2019-09-16 NOTE — TELEPHONE ENCOUNTER
Patient was seen and counseled in ED.  She desires oral contraceptive pill and has no contraindications.  We reviewed risks/benefits.  She was counseled that the risks include but are not limited to Nausea, vomiting, hypertension, headache, increased risk of venous thromboembolism.  She was encouraged if a side effect is arise she should stop the medication and seek medical attention. She was counseled on what to do with missed pills, need for back up contraception.  She was counseled that oral contraceptive pill do not prevent STI transmission and safe sex practices were recommended.  She would like to start an oral contraceptive pill today.  She will follow up in the office in 2 weeks.

## 2019-09-16 NOTE — ED NOTES
Pt set up for pelvic exam at this time. Dr. Kramer aware. Pt changed pad at this time.        Simona Nelson, RN  09/16/19 7592

## 2019-09-16 NOTE — TELEPHONE ENCOUNTER
Went to [facility] ER yesterday for spotting. Had Ultrasound and baby had heartbeat. This morning woke up and was bleeding heavy but not more than pad an hour but is cramping badly.  Pt Ph [phone]

## 2019-09-16 NOTE — DISCHARGE INSTRUCTIONS
You are advised to follow closely with Dr. Andres as directed by her in the ER or in 2-3 days for recheck, final results of lab work and imaging testing, and further testing/treatment as needed.    Drink at least 8 glasses of fluids daily.  Continue prenatal vitamins.  Your hCG level today was 3574  Your hemoglobin today was 15.1        Please return to the emergency department immediately with chest pain different than usual for you, shortness of air, persistent or worsening abdominal pain, persistent vomiting/fever, blood in emesis or stool, lightheadedness/fainting, problems with speech, one sided weakness/numbness, new incontinence, problems with vision, bleeding greater than 1 pad per hour, or for worsening of symptoms or other concerns.

## 2019-09-17 LAB
CYTO UR: NORMAL
LAB AP CASE REPORT: NORMAL
LAB AP CLINICAL INFORMATION: NORMAL
PATH REPORT.FINAL DX SPEC: NORMAL
PATH REPORT.GROSS SPEC: NORMAL

## 2019-09-17 NOTE — TELEPHONE ENCOUNTER
Pt scheduled a 2 week f/u at [facility]. Pt was offered an address but stated she did not have anything to write with and that she would call back for address.

## 2019-09-18 LAB
C TRACH RRNA SPEC DONR QL NAA+PROBE: NEGATIVE
N GONORRHOEA DNA SPEC QL NAA+PROBE: NEGATIVE

## 2019-09-30 ENCOUNTER — OFFICE VISIT (OUTPATIENT)
Dept: OBSTETRICS AND GYNECOLOGY | Facility: CLINIC | Age: 24
End: 2019-09-30

## 2019-09-30 VITALS
HEART RATE: 84 BPM | DIASTOLIC BLOOD PRESSURE: 76 MMHG | SYSTOLIC BLOOD PRESSURE: 112 MMHG | BODY MASS INDEX: 26.5 KG/M2 | HEIGHT: 62 IN | WEIGHT: 144 LBS

## 2019-09-30 DIAGNOSIS — O03.9 MISCARRIAGE: Primary | ICD-10-CM

## 2019-09-30 PROCEDURE — 81025 URINE PREGNANCY TEST: CPT | Performed by: OBSTETRICS & GYNECOLOGY

## 2019-09-30 PROCEDURE — 99213 OFFICE O/P EST LOW 20 MIN: CPT | Performed by: OBSTETRICS & GYNECOLOGY

## 2019-10-03 ENCOUNTER — TELEPHONE (OUTPATIENT)
Dept: OBSTETRICS AND GYNECOLOGY | Facility: CLINIC | Age: 24
End: 2019-10-03

## 2019-10-03 RX ORDER — METRONIDAZOLE 500 MG/1
500 TABLET ORAL 2 TIMES DAILY
Qty: 14 TABLET | Refills: 0 | Status: SHIPPED | OUTPATIENT
Start: 2019-10-03 | End: 2019-10-10

## 2019-10-03 NOTE — TELEPHONE ENCOUNTER
She had normal testing on 9/16/19.  Sometimes an odor arises from the bleeding/spotting and takes a bit to disappear.  If she feels that she may have been exposed to an STI, I recommend coming in for testing.  I can send in a one time Rx for BV as this is more commonly associated with an odor, but if no improvement recommend office visit.

## 2019-10-03 NOTE — TELEPHONE ENCOUNTER
Pt has been informed of no rmal testing on the 9/16/19. Pt is aware that bleeding and/ spotting sometimes can arise odor and that it takes bit to disappear. Pt denies being exposed to STII and has been informed of Rx medication.

## 2019-10-03 NOTE — TELEPHONE ENCOUNTER
Patient called she said that she discussed with you at last visit the off and on bleeding since her miscarriage but she said she didn't mention that she is having a bad odor in her vaginal area. She said she notices it when she has moisture down there, mostly after intercourse and she said its very noticeable but there is no discharge or itching. She said this has not happened until she had miscarriage she is not sure what could be causing it but she says she keeps very good hygiene wants a call back as to why this may be or what to do.

## 2019-10-14 ENCOUNTER — PROCEDURE VISIT (OUTPATIENT)
Dept: OBSTETRICS AND GYNECOLOGY | Facility: CLINIC | Age: 24
End: 2019-10-14

## 2019-10-14 VITALS
HEART RATE: 92 BPM | BODY MASS INDEX: 27.23 KG/M2 | WEIGHT: 148 LBS | DIASTOLIC BLOOD PRESSURE: 78 MMHG | SYSTOLIC BLOOD PRESSURE: 125 MMHG | HEIGHT: 62 IN

## 2019-10-14 DIAGNOSIS — Z30.430 ENCOUNTER FOR INSERTION OF MIRENA IUD: Primary | ICD-10-CM

## 2019-10-14 PROCEDURE — 58300 INSERT INTRAUTERINE DEVICE: CPT | Performed by: OBSTETRICS & GYNECOLOGY

## 2019-10-14 PROCEDURE — 81025 URINE PREGNANCY TEST: CPT | Performed by: OBSTETRICS & GYNECOLOGY

## 2019-10-14 NOTE — PROGRESS NOTES
Mirena IUD Insertion Procedure Note    Indications: Desires LARC    Pre Procedural Diagnosis: Desires LARC with Mirena IUD    Post Procedural Diagnosis: Same    Counseling:  Patient was counseled on risks of IUD insertion including but not limited to abnormal bleeding, infection, uterine perforation, expulsion, failure of contraception resulting in pregnancy, need for additional procedures and/or need for surgery.  All questions were answered to apparent satisfaction.  She was counseled about the duration of treatment, recommended removal in 5 years.  She was advised to perform monthly string checks and to see evaluation with unexpected changes in bleeding patterns and/or unable to feel the strings.      Procedure Details   Urine pregnancy test was done, and result was negative.  Gonorrhea/chlamydia testing was done and result was pending.    Bimanual exam revealed Anteverted. Cervix cleansed with Betadine. Tenaculum applied to the anterior lip.  Uterus sounded to 7 cm. IUD inserted without difficulty. String visible and trimmed. Patient tolerated procedure well.    IUD Information:  See medication record.    Condition:  Stable    Complications:  None    Plan:    The patient was advised to call for any fever or for prolonged or severe pain or bleeding; she was also advised to use a back up method of contraception for 7 days or abstain from intercourse. She was advised to use OTC analgesics as needed for mild to moderate pain.  Return to the clinic in 4 weeks for follow-up.

## 2019-10-16 LAB
C TRACH RRNA SPEC QL NAA+PROBE: NEGATIVE
N GONORRHOEA RRNA SPEC QL NAA+PROBE: NEGATIVE
T VAGINALIS DNA SPEC QL NAA+PROBE: NEGATIVE

## 2019-10-21 ENCOUNTER — TELEPHONE (OUTPATIENT)
Dept: OBSTETRICS AND GYNECOLOGY | Facility: CLINIC | Age: 24
End: 2019-10-21

## 2019-11-01 ENCOUNTER — TELEPHONE (OUTPATIENT)
Dept: OBSTETRICS AND GYNECOLOGY | Facility: CLINIC | Age: 24
End: 2019-11-01

## 2019-11-01 NOTE — TELEPHONE ENCOUNTER
Hello,   Patient is calling, she said she has had UTI symptoms for about 3 days, she is wondering if you can call something in for her.  Confirmed pharmacy

## 2019-11-04 NOTE — TELEPHONE ENCOUNTER
Called pt and states she will come to either the [facility] or [facility] to leave a urine sample.     Pt was added to the nursing schedule

## 2019-11-11 ENCOUNTER — OFFICE VISIT (OUTPATIENT)
Dept: OBSTETRICS AND GYNECOLOGY | Facility: CLINIC | Age: 24
End: 2019-11-11

## 2019-11-11 VITALS
SYSTOLIC BLOOD PRESSURE: 119 MMHG | BODY MASS INDEX: 27.05 KG/M2 | DIASTOLIC BLOOD PRESSURE: 82 MMHG | HEIGHT: 62 IN | HEART RATE: 88 BPM | WEIGHT: 147 LBS

## 2019-11-11 DIAGNOSIS — R30.0 DYSURIA: Primary | ICD-10-CM

## 2019-11-11 LAB
BILIRUB BLD-MCNC: NEGATIVE MG/DL
CLARITY, POC: CLEAR
COLOR UR: YELLOW
GLUCOSE UR STRIP-MCNC: NEGATIVE MG/DL
KETONES UR QL: NEGATIVE
LEUKOCYTE EST, POC: NEGATIVE
NITRITE UR-MCNC: NEGATIVE MG/ML
PH UR: 7 [PH] (ref 5–8)
PROT UR STRIP-MCNC: NEGATIVE MG/DL
RBC # UR STRIP: NEGATIVE /UL
SP GR UR: 1.01 (ref 1–1.03)
UROBILINOGEN UR QL: NORMAL

## 2019-11-11 PROCEDURE — 99212 OFFICE O/P EST SF 10 MIN: CPT | Performed by: OBSTETRICS & GYNECOLOGY

## 2020-04-16 ENCOUNTER — TELEPHONE (OUTPATIENT)
Dept: OBSTETRICS AND GYNECOLOGY | Facility: CLINIC | Age: 25
End: 2020-04-16

## 2020-04-16 NOTE — TELEPHONE ENCOUNTER
There can be other reasons for bleeding which typically require an exam.  If she feels this is urgent, I am happy to see her in the office.  I do not know if a video visit would be of use as usually an exam would need to be performed. Thanks!

## 2020-04-16 NOTE — TELEPHONE ENCOUNTER
Good Morning,    Patient called and would like some advice on if she should have her IUD removed. States every time she has intercourse she bleeds, she went three months with out a cycle and now she has been on it for two weeks. She stated it will let up but if she has intercourse it will come right back. She originally stated she would like to just have it removed but then said she wasn't sure. Should this be an evisit or should she seen in office?    Please advise, thank you!    Marisa

## 2020-05-05 ENCOUNTER — OFFICE VISIT (OUTPATIENT)
Dept: OBSTETRICS AND GYNECOLOGY | Facility: CLINIC | Age: 25
End: 2020-05-05

## 2020-05-05 VITALS
HEIGHT: 62 IN | BODY MASS INDEX: 26.68 KG/M2 | HEART RATE: 103 BPM | DIASTOLIC BLOOD PRESSURE: 77 MMHG | WEIGHT: 145 LBS | SYSTOLIC BLOOD PRESSURE: 125 MMHG

## 2020-05-05 DIAGNOSIS — Z30.432 ENCOUNTER FOR REMOVAL OF INTRAUTERINE CONTRACEPTIVE DEVICE (IUD): Primary | ICD-10-CM

## 2020-05-05 PROCEDURE — 58301 REMOVE INTRAUTERINE DEVICE: CPT | Performed by: OBSTETRICS & GYNECOLOGY

## 2020-05-05 PROCEDURE — 81025 URINE PREGNANCY TEST: CPT | Performed by: OBSTETRICS & GYNECOLOGY

## 2020-05-05 NOTE — PROGRESS NOTES
Mirena IUD Removal Procedure Note    Indications: Desires removal of Mirena IUD due to desire for conception  Counseling:  Patient reports that she is not currently bleeding; she desires removal of IUD secondary to desire for conception. she is on a multivitamin. She denies any STI exposures or new partner. Declines STI testing. Patient was counseled on risks of IUD removal including but not limited to abnormal bleeding, infection, pregnancy, need for additional procedures and/or need for surgery.  All questions were answered to apparent satisfaction.     Procedure Details     Bimanual exam revealed Anteverted.  A speculum was inserted. The IUD strings were seen, grasped with a ring forcep and removed without difficulty.  The Mirena IUD was inspected and noted to be removed in its entirety.  Patient tolerated procedure well.    IUD Information:  See medication record.    Condition:  Stable    Complications:  None    Plan:    The patient was advised to call for any fever or for prolonged or severe pain or bleeding; she was also advised to use start PNV; follow up if no cycle in 3 monts. She was advised to use OTC analgesics as needed for mild to moderate pain.  Return to the clinic PRN for follow-up.

## 2020-08-06 ENCOUNTER — TELEPHONE (OUTPATIENT)
Dept: OBSTETRICS AND GYNECOLOGY | Facility: CLINIC | Age: 25
End: 2020-08-06

## 2020-08-06 RX ORDER — ONDANSETRON 4 MG/1
4 TABLET, FILM COATED ORAL DAILY PRN
Qty: 30 TABLET | Refills: 1 | Status: SHIPPED | OUTPATIENT
Start: 2020-08-06 | End: 2020-08-07

## 2020-08-07 ENCOUNTER — TELEPHONE (OUTPATIENT)
Dept: OBSTETRICS AND GYNECOLOGY | Facility: CLINIC | Age: 25
End: 2020-08-07

## 2020-08-07 RX ORDER — ONDANSETRON 4 MG/1
4 TABLET, ORALLY DISINTEGRATING ORAL EVERY 8 HOURS PRN
Qty: 30 TABLET | Refills: 0 | Status: SHIPPED | OUTPATIENT
Start: 2020-08-07

## 2020-08-07 NOTE — TELEPHONE ENCOUNTER
Dr. Andres sent an rx for Zofran for pt. Pt insurances states they will only approve the ODT 4mg that melts under tongue.   Is there any way this could be sent into pharmacy. Please advise?

## 2020-08-24 ENCOUNTER — TELEPHONE (OUTPATIENT)
Dept: OBSTETRICS AND GYNECOLOGY | Facility: CLINIC | Age: 25
End: 2020-08-24

## 2021-04-16 ENCOUNTER — BULK ORDERING (OUTPATIENT)
Dept: CASE MANAGEMENT | Facility: OTHER | Age: 26
End: 2021-04-16

## 2021-04-16 DIAGNOSIS — Z23 IMMUNIZATION DUE: ICD-10-CM
